# Patient Record
Sex: FEMALE | Race: WHITE | NOT HISPANIC OR LATINO | Employment: OTHER | URBAN - METROPOLITAN AREA
[De-identification: names, ages, dates, MRNs, and addresses within clinical notes are randomized per-mention and may not be internally consistent; named-entity substitution may affect disease eponyms.]

---

## 2017-11-21 ENCOUNTER — OFFICE VISIT (OUTPATIENT)
Dept: ALLERGY | Facility: CLINIC | Age: 82
End: 2017-11-21
Payer: MEDICARE

## 2017-11-21 VITALS
WEIGHT: 137.38 LBS | SYSTOLIC BLOOD PRESSURE: 130 MMHG | BODY MASS INDEX: 28.84 KG/M2 | DIASTOLIC BLOOD PRESSURE: 78 MMHG | HEIGHT: 58 IN

## 2017-11-21 DIAGNOSIS — K21.9 LARYNGOPHARYNGEAL REFLUX (LPR): ICD-10-CM

## 2017-11-21 DIAGNOSIS — H10.403 CHRONIC CONJUNCTIVITIS OF BOTH EYES, UNSPECIFIED CHRONIC CONJUNCTIVITIS TYPE: ICD-10-CM

## 2017-11-21 DIAGNOSIS — H91.93 BILATERAL HEARING LOSS, UNSPECIFIED HEARING LOSS TYPE: ICD-10-CM

## 2017-11-21 DIAGNOSIS — J31.0 OTHER CHRONIC RHINITIS: Primary | ICD-10-CM

## 2017-11-21 DIAGNOSIS — I10 BENIGN ESSENTIAL HTN: ICD-10-CM

## 2017-11-21 PROCEDURE — 99204 OFFICE O/P NEW MOD 45 MIN: CPT | Mod: ,,, | Performed by: ALLERGY & IMMUNOLOGY

## 2017-11-21 RX ORDER — LOVASTATIN 20 MG/1
20 TABLET ORAL NIGHTLY
COMMUNITY
End: 2020-02-04 | Stop reason: SDUPTHER

## 2017-11-21 RX ORDER — FUROSEMIDE 40 MG/1
40 TABLET ORAL DAILY
COMMUNITY

## 2017-11-21 RX ORDER — MONTELUKAST SODIUM 10 MG/1
10 TABLET ORAL DAILY
COMMUNITY
End: 2021-02-09

## 2017-11-21 RX ORDER — FLUTICASONE PROPIONATE 50 MCG
1 SPRAY, SUSPENSION (ML) NASAL DAILY
Qty: 3 BOTTLE | Refills: 4 | Status: SHIPPED | OUTPATIENT
Start: 2017-11-21 | End: 2018-07-23 | Stop reason: SDUPTHER

## 2017-11-21 RX ORDER — HYDROXYCHLOROQUINE SULFATE 200 MG/1
200 TABLET, FILM COATED ORAL 2 TIMES DAILY
COMMUNITY

## 2017-11-21 RX ORDER — AZELASTINE 1 MG/ML
1 SPRAY, METERED NASAL 2 TIMES DAILY
Qty: 90 ML | Refills: 3 | Status: SHIPPED | OUTPATIENT
Start: 2017-11-21 | End: 2018-07-23 | Stop reason: SDUPTHER

## 2017-11-21 RX ORDER — OMEPRAZOLE 20 MG/1
20 CAPSULE, DELAYED RELEASE ORAL 2 TIMES DAILY
COMMUNITY
End: 2020-02-13 | Stop reason: SDUPTHER

## 2017-11-21 RX ORDER — AMLODIPINE BESYLATE 5 MG/1
5 TABLET ORAL DAILY
COMMUNITY
End: 2018-07-23

## 2017-11-21 NOTE — PROGRESS NOTES
"Subjective:       Patient ID: Mai Suarez is a 89 y.o. female.    Chief Complaint: Allergies (stuffy, itchy, runny nose, hoarseness)    HPI     Pt is referred from Dr. Del Toro for rhinitis symptoms. Native to [facility].   Main symptoms are congestion, rhinorrhea, pruritus, hoarseness  Tx:  none  Onset: jan 2016  Timing of day doesn't matter.     She has hoarseness in her voice that "comes and goes."  She has associated reflux for which she takes prilosec. She does not take this on an empty stomach but after breakfast.       Review of Systems      General: neg unexpected weight changes, fevers, chills, night sweats, malaise  HEENT: see hpi, Neg eye pain, vision changes, ear drainage, nose bleeds, throat tightness, sores in the mouth  CV: Neg chest pain, palpitations, swelling  Resp: see hpi, +cough neg shortness of breath, hemoptysis,   GI: see hpi, neg dysphagia, night abdominal pain, reflux, chronic diarrhea, chronic constipation  Derm: See Hpi, neg new rash, neg flushing  Mu/sk: Neg joint pain, joint swelling   Psych: Neg anxiety  neuro: neg chronic headaches, muscle weakness  Endo: neg heat/cold intolerance, chronic fatigue    Objective:       Vitals:    11/21/17 1045   BP: 130/78   Weight: 62.3 kg (137 lb 6.4 oz)   Height: 4' 10" (1.473 m)   ;  Physical Exam    General: no acute distress, well developed well nourished   HEENT:   Head:normocephalic atraumatic  Eyes: DAIANA, EOMI, Neg injection, scleral icterus, or conjunctival papillary hypertrophy.  Ears: tm clear bilaterally, normal canal  Nose:3+ inferior turbinates pink, neg nasal polyps            Mucosa:mild dryness            Septal irritation: none  OP: mucus membranes moist, - cobblestoning, - PND, neg erythema or lesions  Neck: supple, Full range of motion, neg lymphadenopathy  Chest: full respiratory excursion no abnormal chest abnormality  Resp: clear to ascultation bilaterally  CV: RRR, neg MRG, brisk capillary refill  Abdomen: BS+, non tender, non " distended   Ext:  Neg clubbing, cyanosis, + pitting edema- mild   Skin: Neg rashes or lesions  Lymph: neg supraclavicular, axillary     Assessment:       1. Other chronic rhinitis    2. Laryngopharyngeal reflux (LPR)    3. Bilateral hearing loss, unspecified hearing loss type    4. Benign essential HTN    5. Chronic conjunctivitis of both eyes, unspecified chronic conjunctivitis type        Plan:       Other chronic rhinitis  -     fluticasone (FLONASE) 50 mcg/actuation nasal spray; 1 spray by Each Nare route once daily.  Dispense: 3 Bottle; Refill: 4  -     azelastine (ASTELIN) 137 mcg (0.1 %) nasal spray; 1 spray (137 mcg total) by Nasal route 2 (two) times daily.  Dispense: 90 mL; Refill: 3    Laryngopharyngeal reflux (LPR)    Bilateral hearing loss, unspecified hearing loss type    Benign essential HTN    Chronic conjunctivitis of both eyes, unspecified chronic conjunctivitis type    rhinitis action plan reviewed. LPR action plan reviewed  Technique reviewed and dymista sample given in clinic today.   Pt to take PPI on empty stomach 40 mins before breakfast.   Start saline routinely  Start fluticasone routinely 1 sen bid, start azelastine 1 sen bid- nasal sprays should help with nasal symptoms.   Continue montelukast daily.   Pt to avoid decongestants as it may increase her blood pressure.   It may be difficult to communicate with her due to hearing loss, therefore everything was written down and given to her to ensure her understanding of the plan.       Follow up in 4-6 weeks, sooner if needed.     Radha Gandhi M.D.  Allergy/Immunology  [facility]   [phone] phone  [phone] fax

## 2017-11-21 NOTE — PATIENT INSTRUCTIONS
NOSE:   FLONASE OR FLUTICASONE 2 SPRAYS PER NOSTRIL TWICE DAILY FOR 2 WEEKS THEN ADJUST PER YOUR SYMPTOMS.   AZELASTINE - 1 SPRAY PER NOSTRIL TWICE DAILY OR AS NEEDED    MONITOR REFLUX SYMPTOMS:  TAKE OMEPRAZOLE OR PRILOSEC 40 MINS BEFORE BREAKFAST.

## 2017-11-21 NOTE — LETTER
November 21, 2017      Meng Del Toro MD  [address]  [address]  [place] [address]           [facility]  [address]  [address]  [place] [address]  Phone: [phone]  Fax: [phone]          Patient: Mai Suarez   MR Number: [id number]   YOB: 1919   Date of Visit: 11/21/2017       Dear Dr. Meng Del Toro:    Thank you for referring Mai Suarez to me for evaluation. Attached you will find relevant portions of my assessment and plan of care.    If you have questions, please do not hesitate to call me. I look forward to following Mai Suarez along with you.    Sincerely,    Radha Gandhi MD    Enclosure  CC:  No Recipients    If you would like to receive this communication electronically, please contact [email or web address] or [phone] to request more information on [employer] access.    For providers and/or their staff who would like to refer a patient to [facility], please contact us through our one-stop-shop provider referral line, Kendall Umanzor, at [phone].    If you feel you have received this communication in error or would no longer like to receive these types of communications, please e-mail [email or web address]

## 2017-11-21 NOTE — LETTER
November 21, 2017        Meng Del Toro MD  [address]  [address]  [facility] [address]             [facility]  [address]  [address]  [facility] [address]  Phone: [phone]  Fax: [phone]   Patient: Mai Suarez   MR Number: [id number]   YOB: 1919   Date of Visit: 11/21/2017       Dear Dr. Del Toro:    Thank you for referring Mai Suarez to me for evaluation. Attached you will find relevant portions of my assessment and plan of care.    If you have questions, please do not hesitate to call me. I look forward to following Mai Suarez along with you.    Sincerely,      MD SULAIMAN Travis MD Carl M. Gauthier Jr., MD Enclosure

## 2018-01-08 ENCOUNTER — OFFICE VISIT (OUTPATIENT)
Dept: ALLERGY | Facility: CLINIC | Age: 83
End: 2018-01-08
Payer: MEDICARE

## 2018-01-08 VITALS
HEIGHT: 58 IN | DIASTOLIC BLOOD PRESSURE: 88 MMHG | BODY MASS INDEX: 28.86 KG/M2 | WEIGHT: 137.5 LBS | SYSTOLIC BLOOD PRESSURE: 120 MMHG

## 2018-01-08 DIAGNOSIS — K21.9 LARYNGOPHARYNGEAL REFLUX (LPR): ICD-10-CM

## 2018-01-08 DIAGNOSIS — H91.93 BILATERAL HEARING LOSS, UNSPECIFIED HEARING LOSS TYPE: ICD-10-CM

## 2018-01-08 DIAGNOSIS — I10 BENIGN ESSENTIAL HTN: ICD-10-CM

## 2018-01-08 DIAGNOSIS — J31.0 OTHER CHRONIC RHINITIS: Primary | ICD-10-CM

## 2018-01-08 PROCEDURE — 99214 OFFICE O/P EST MOD 30 MIN: CPT | Mod: ,,, | Performed by: ALLERGY & IMMUNOLOGY

## 2018-01-08 NOTE — PROGRESS NOTES
"Subjective:       Patient ID: Mai Suarez is a 89 y.o. female.    Chief Complaint: Allergies    HPI     Pt presents from November for Follow up.   She states her headaches and rhinorrhea improved with tastes and smell, however, she still has sx.   She is taking fluticasone, azelastine, and ipratropium. She hasn't taken montelukast or singulair.   She is also concerned with hoarseness in her voice. She isn't using saline.   No one has visualized her vocal cords.   Timing of day doesn't matter for hoarseness and this comes and goes.     She has associated reflux for which she takes prilosec. She does not take this on an empty stomach but after breakfast.       Review of Systems      General: neg unexpected weight changes, fevers, chills, night sweats, malaise  HEENT: see hpi, Neg eye pain, vision changes, ear drainage, nose bleeds, throat tightness, sores in the mouth  CV: Neg chest pain, palpitations, swelling  Resp: see hpi, +cough neg shortness of breath, hemoptysis,   GI: see hpi, neg dysphagia, night abdominal pain, reflux, chronic diarrhea, chronic constipation  Derm: See Hpi, neg new rash, neg flushing  Mu/sk: Neg joint pain, joint swelling   Psych: Neg anxiety  neuro: neg chronic headaches, muscle weakness  Endo: neg heat/cold intolerance, chronic fatigue    Objective:       Vitals:    01/08/18 1103 01/08/18 1117   BP: (!) 150/84 120/88   Weight: 62.4 kg (137 lb 8 oz)    Height: 4' 10" (1.473 m)    ;  Physical Exam    General: no acute distress, well developed well nourished   HEENT:   Head:normocephalic atraumatic  Eyes: DAIANA, EOMI, Neg injection, scleral icterus, or conjunctival papillary hypertrophy.  Ears: tm clear bilaterally, normal canal  Nose: 2+ inferior turbinates pink, neg nasal polyps            Mucosa:mild dryness            Septal irritation: none  OP: mucus membranes moist, - cobblestoning, - PND, neg erythema or lesions  Neck: supple, Full range of motion, neg lymphadenopathy  Chest: full " respiratory excursion no abnormal chest abnormality  Resp: clear to ascultation bilaterally  CV: RRR, neg MRG, brisk capillary refill  Abdomen: BS+, non tender, non distended   Ext:  Neg clubbing, cyanosis, + pitting edema- mild   Skin: Neg rashes or lesions  Lymph: neg supraclavicular, axillary     Assessment:       1. Other chronic rhinitis    2. Laryngopharyngeal reflux (LPR)    3. Benign essential HTN    4. Bilateral hearing loss, unspecified hearing loss type        Plan:       Other chronic rhinitis    Laryngopharyngeal reflux (LPR)    Benign essential HTN    Bilateral hearing loss, unspecified hearing loss type    rhinitis action plan reviewed. LPR action plan reviewed. Still having post nasal drip but sense of smell improving.   Action plan written on word document in large letters to help her read it.   Increase fluticasone and azelastine and iptratropium  Start saline routinely  Recommended she see Dr. Garcia for vocal cord visualization.   He can visualize for LPR.    Follow up in 6 months, sooner if needed.     Radha Gandhi M.D.  Allergy/Immunology  [facility]   [phone] phone  [phone] fax

## 2018-07-23 ENCOUNTER — OFFICE VISIT (OUTPATIENT)
Dept: ALLERGY | Facility: CLINIC | Age: 83
End: 2018-07-23
Payer: MEDICARE

## 2018-07-23 VITALS
HEIGHT: 58 IN | DIASTOLIC BLOOD PRESSURE: 68 MMHG | BODY MASS INDEX: 28.8 KG/M2 | WEIGHT: 137.19 LBS | SYSTOLIC BLOOD PRESSURE: 120 MMHG

## 2018-07-23 DIAGNOSIS — R09.82 POST-NASAL DRIP: ICD-10-CM

## 2018-07-23 DIAGNOSIS — J34.89 RHINORRHEA: Primary | ICD-10-CM

## 2018-07-23 PROCEDURE — 99213 OFFICE O/P EST LOW 20 MIN: CPT | Mod: ,,, | Performed by: ALLERGY & IMMUNOLOGY

## 2018-07-23 RX ORDER — FLUTICASONE PROPIONATE 50 MCG
1 SPRAY, SUSPENSION (ML) NASAL 2 TIMES DAILY
Qty: 3 BOTTLE | Refills: 4 | Status: SHIPPED | OUTPATIENT
Start: 2018-07-23 | End: 2021-03-20

## 2018-07-23 RX ORDER — AZELASTINE 1 MG/ML
1 SPRAY, METERED NASAL 2 TIMES DAILY
Qty: 90 ML | Refills: 3 | Status: SHIPPED | OUTPATIENT
Start: 2018-07-23 | End: 2020-07-08

## 2018-07-23 RX ORDER — ACETAMINOPHEN 500 MG
500 TABLET ORAL EVERY 6 HOURS PRN
COMMUNITY

## 2018-07-23 RX ORDER — METOPROLOL SUCCINATE 100 MG/1
100 TABLET, EXTENDED RELEASE ORAL DAILY
COMMUNITY
End: 2020-11-23

## 2018-07-23 RX ORDER — LOSARTAN POTASSIUM 100 MG/1
100 TABLET ORAL DAILY
COMMUNITY
End: 2020-01-24 | Stop reason: SDUPTHER

## 2018-07-23 RX ORDER — IPRATROPIUM BROMIDE 21 UG/1
SPRAY, METERED NASAL
Qty: 90 ML | Refills: 4 | Status: SHIPPED | OUTPATIENT
Start: 2018-07-23 | End: 2020-07-08

## 2018-07-23 NOTE — PATIENT INSTRUCTIONS
Nose:  Ipratropium nasal spray for runny nose 2 sprays each nare as needed every 6 hours    Azelastine 1 spray per nare twice per day - congestion post nasal drip  - max 4 times per day     Fluticasone - congestion, post nasal drip , inflammation- 1 spray per nare twice per day  - max 2 sprays per nare twice per day

## 2018-07-23 NOTE — PROGRESS NOTES
"Subjective:       Patient ID: Mai Suarez is a 89 y.o. female.    Chief Complaint: Rhinitis (6 month f/u patient states she feels fine, but does still have the sniffles)    HPI     Pt presents from January for Follow up.   She states her headaches and rhinorrhea improved with tastes and smell, however, she still has sneezing and "sniffles."  She is taking fluticasone, azelastine 1 sen bid and ipratropium- ran out. She hasn't taken montelukast or singulair.   She is also concerned with hoarseness in her voice, but that is better. She isn't using saline.   Timing of day doesn't matter for hoarseness and this comes and goes.     She has associated reflux for which she takes prilosec. She does not take this on an empty stomach but after breakfast.       Review of Systems      General: neg unexpected weight changes, fevers, chills, night sweats, malaise  HEENT: see hpi, Neg eye pain, vision changes, ear drainage, nose bleeds, throat tightness, sores in the mouth  CV: Neg chest pain, palpitations, swelling  Resp: see hpi, +cough neg shortness of breath, hemoptysis,   GI: see hpi, neg dysphagia, night abdominal pain, reflux, chronic diarrhea, chronic constipation  Derm: See Hpi, neg new rash, neg flushing  Mu/sk: Neg joint pain, joint swelling   Psych: Neg anxiety  neuro: neg chronic headaches, muscle weakness  Endo: neg heat/cold intolerance, chronic fatigue    Objective:       Vitals:    07/23/18 0949   BP: 120/68   Weight: 62.2 kg (137 lb 3.2 oz)   Height: 4' 10" (1.473 m)   ;  Physical Exam    General: no acute distress, well developed well nourished   HEENT:   Head:normocephalic atraumatic  Eyes: DAIANA, EOMI, Neg injection, scleral icterus, or conjunctival papillary hypertrophy.  Ears: tm clear bilaterally, normal canal  Nose: 2+ inferior turbinates pink, neg nasal polyps            Mucosa:mild dryness            Septal irritation: none  OP: mucus membranes moist, - cobblestoning, - PND, neg erythema or " lesions  Neck: supple, Full range of motion, neg lymphadenopathy  Chest: full respiratory excursion no abnormal chest abnormality  Resp: clear to ascultation bilaterally  CV: RRR, neg MRG, brisk capillary refill  Abdomen: BS+, non tender, non distended   Ext:  Neg clubbing, cyanosis, - pitting edema- mild   Skin: Neg rashes or lesions  Lymph: neg supraclavicular, axillary     Assessment:       1. Rhinorrhea    2. Post-nasal drip        Plan:       Rhinorrhea  -     ipratropium (ATROVENT) 0.03 % nasal spray; 2 sprays per nare as needed four times daily for sniffles  Dispense: 90 mL; Refill: 4  -     fluticasone (FLONASE) 50 mcg/actuation nasal spray; 1 spray (50 mcg total) by Each Nare route 2 (two) times daily.  Dispense: 3 Bottle; Refill: 4    Post-nasal drip  -     azelastine (ASTELIN) 137 mcg (0.1 %) nasal spray; 1 spray (137 mcg total) by Nasal route 2 (two) times daily.  Dispense: 90 mL; Refill: 3      rhinitis action plan reviewed. LPR action plan reviewed.  Sense of smell better  Sense of taste is better      Action plan written on word document in large letters to help her read it.   Increase fluticasone and azelastine and restart iptratropium  Start saline routinely  Recommended she see Dr. Garcia for vocal cord visualization if hoarseness still an issue   He can visualize for LPR.    Follow up in 6 months, sooner if needed.     Radha Gandhi M.D.  Allergy/Immunology  [facility]   [phone] phone  [phone] fax

## 2019-06-28 ENCOUNTER — OFFICE VISIT (OUTPATIENT)
Dept: HEMATOLOGY/ONCOLOGY | Facility: CLINIC | Age: 84
End: 2019-06-28
Payer: MEDICARE

## 2019-06-28 ENCOUNTER — TELEPHONE (OUTPATIENT)
Dept: HEMATOLOGY/ONCOLOGY | Facility: CLINIC | Age: 84
End: 2019-06-28

## 2019-06-28 VITALS
HEART RATE: 68 BPM | DIASTOLIC BLOOD PRESSURE: 78 MMHG | TEMPERATURE: 98 F | WEIGHT: 136.38 LBS | BODY MASS INDEX: 31.56 KG/M2 | HEIGHT: 55 IN | SYSTOLIC BLOOD PRESSURE: 180 MMHG | RESPIRATION RATE: 20 BRPM

## 2019-06-28 DIAGNOSIS — I82.412 DEEP VENOUS THROMBOSIS OF LEFT PROFUNDA FEMORIS VEIN: ICD-10-CM

## 2019-06-28 PROCEDURE — 99204 OFFICE O/P NEW MOD 45 MIN: CPT | Mod: ,,, | Performed by: INTERNAL MEDICINE

## 2019-06-28 PROCEDURE — 99204 PR OFFICE/OUTPT VISIT, NEW, LEVL IV, 45-59 MIN: ICD-10-PCS | Mod: ,,, | Performed by: INTERNAL MEDICINE

## 2019-07-13 PROBLEM — I82.412 DEEP VENOUS THROMBOSIS OF LEFT PROFUNDA FEMORIS VEIN: Status: ACTIVE | Noted: 2019-07-13

## 2019-07-14 NOTE — PROGRESS NOTES
[facility] History & Physical    Subjective:      Patient ID:   Mai Suarez  89 y.o. female  12/4/1919  [place]      Chief Complaint:   L leg DVT    HPI:  89 y.o. female with L leg swelling and weeping x's 2 months, found to have DVT.    Hx HTN, GERD, angina, RA, OA.    T&A at 10, appendectomy at 21, C section x's 3. BTL, L & R cataract, R hand x's 2, L knee x's 3, R foot x's 1.    Smoke 1 ppd x's 50 years, none x's 20 years.  ETOH no.  Job .  Allergy no.    Migrated from [place] to [place], 1926 to 1944.  Father had mustard gas exposure WW1. 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    Dad AMI, mustard gas exposure.  Mom CVA.  Sister, sickly.  Daughter pneumonia, auto immune dx.  No family hx of DVT, PE.      ROS:   GEN: normal without any fever, night sweats or weight loss  HEENT: normal with no HA's, sore throat, stiff neck, changes in vision  CV: normal with no CP, SOB, PND, AKHTAR or orthopnea  PULM: normal with no SOB, cough, hemoptysis, sputum or pleuritic pain  GI: normal with no abdominal pain, nausea, vomiting, constipation, diarrhea, melanotic stools, BRBPR, or hematemesis  : normal with no hematuria, dysuria  BREAST: normal with no mass, discharge, pain  SKIN: normal with no rash, erythema, bruising, or swelling     Past Medical History:   Diagnosis Date    Benign essential HTN     Hearing loss     Hypercholesterolemia     Laryngopharyngeal reflux (LPR)     Rheumatoid arthritis     Wears dentures      No past surgical history on file.    Review of patient's allergies indicates:  No Known Allergies        Current Outpatient Medications:     acetaminophen (TYLENOL) 500 MG tablet, Take 500 mg by mouth every 6 (six) hours as needed for Pain., Disp: , Rfl:     apixaban (ELIQUIS) 5 mg (74 tabs) DsPk, Take by mouth. For the first 7 days take two 5 mg tablets twice daily.  After 7 days take one 5 mg tablet twice daily., Disp: , Rfl:     fluticasone (FLONASE) 50 mcg/actuation nasal spray, 1  "spray (50 mcg total) by Each Nare route 2 (two) times daily., Disp: 3 Bottle, Rfl: 4    furosemide (LASIX) 20 MG tablet, Take 40 mg by mouth once daily. , Disp: , Rfl:     hydroxychloroquine (PLAQUENIL) 200 mg tablet, Take 200 mg by mouth 2 (two) times daily., Disp: , Rfl:     ipratropium (ATROVENT) 0.03 % nasal spray, 2 sprays per nare as needed four times daily for sniffles, Disp: 90 mL, Rfl: 4    losartan (COZAAR) 100 MG tablet, Take 100 mg by mouth once daily., Disp: , Rfl:     lovastatin (MEVACOR) 20 MG tablet, Take 20 mg by mouth every evening., Disp: , Rfl:     metoprolol succinate (TOPROL-XL) 100 MG 24 hr tablet, Take 100 mg by mouth once daily., Disp: , Rfl:     montelukast (SINGULAIR) 10 mg tablet, Take 10 mg by mouth once daily., Disp: , Rfl:     omeprazole (PRILOSEC) 20 MG capsule, Take 20 mg by mouth 2 (two) times daily., Disp: , Rfl:     azelastine (ASTELIN) 137 mcg (0.1 %) nasal spray, 1 spray (137 mcg total) by Nasal route 2 (two) times daily., Disp: 90 mL, Rfl: 3          Objective:   Vitals:  Blood pressure (!) 180/78, pulse 68, temperature 97.8 °F (36.6 °C), temperature source Oral, resp. rate 20, height 4' 7" (1.397 m), weight 61.9 kg (136 lb 6.4 oz).    Physical Examination:   GEN: no apparent distress, comfortable  HEAD: atraumatic and normocephalic  EYES: no pallor, no icterus  ENT:  no pharyngeal erythema, external ears WNL; no nasal discharge; no thrush  NECK: no masses, thyroid normal, trachea midline, no LAD/LN's, supple  CV: RRR with no murmur; normal pulse; normal S1 and S2; no pedal edema  CHEST: Normal respiratory effort; CTAB; normal breath sounds; no wheeze or crackles  ABDOM: nontender and nondistended; soft;  no rebound/guarding  MUSC/Skeletal: ROM normal; no crepitus; joints normal; no deformities   EXTREM: L & R leg with varicose veins and 2+ edema.  SKIN: no rashes, lesions, ulcers, petechiae   : no cvat  NEURO: grossly intact; motor/sensory WNL;  no tremors  PSYCH: " normal mood, affect and behavior  LYMPH: normal cervical, supraclavicular, axillary and inguinal LN.  \    Assessment:   (1) 89 y.o. female with diagnosis of R leg femoral vein DVT, 5/26/19.    (2)Hgb 12.2, WBC 12,300, plt cnt 159,000.    Continue Eliquis 5 mg BID.    RTC 2-3 months.    After 6 months of anticoagulation, will   Re check U/S of Leg and check   Hypercoag. Eval.    Given the extent of her varicose veins and edema of the legs, I would continue on Eliquis chronically.         I have explained and the patient understands all of  the current recommendation(s). I have answered all of their questions to the best of my ability and to their complete satisfaction.

## 2019-08-12 ENCOUNTER — OFFICE VISIT (OUTPATIENT)
Dept: HEMATOLOGY/ONCOLOGY | Facility: CLINIC | Age: 84
End: 2019-08-12
Payer: MEDICARE

## 2019-08-12 VITALS
TEMPERATURE: 98 F | BODY MASS INDEX: 30.05 KG/M2 | RESPIRATION RATE: 20 BRPM | HEART RATE: 63 BPM | WEIGHT: 129.31 LBS | DIASTOLIC BLOOD PRESSURE: 73 MMHG | SYSTOLIC BLOOD PRESSURE: 141 MMHG

## 2019-08-12 DIAGNOSIS — I82.5Z1 LOWER LEG DVT (DEEP VENOUS THROMBOEMBOLISM), CHRONIC, RIGHT: ICD-10-CM

## 2019-08-12 DIAGNOSIS — M79.604 PAIN AND SWELLING OF LOWER EXTREMITY, RIGHT: ICD-10-CM

## 2019-08-12 DIAGNOSIS — I82.412 DEEP VENOUS THROMBOSIS OF LEFT PROFUNDA FEMORIS VEIN: Primary | ICD-10-CM

## 2019-08-12 DIAGNOSIS — M79.89 PAIN AND SWELLING OF LOWER EXTREMITY, RIGHT: ICD-10-CM

## 2019-08-12 PROCEDURE — 99214 OFFICE O/P EST MOD 30 MIN: CPT | Mod: S$GLB,,, | Performed by: INTERNAL MEDICINE

## 2019-08-12 PROCEDURE — 99214 PR OFFICE/OUTPT VISIT, EST, LEVL IV, 30-39 MIN: ICD-10-PCS | Mod: S$GLB,,, | Performed by: INTERNAL MEDICINE

## 2019-08-22 NOTE — PROGRESS NOTES
[facility] History & Physical    Subjective:      Patient ID:   Mai Suarez  89 y.o. female  12/4/1919  [place]      Chief Complaint:   L leg DVT    HPI:  89 y.o. female with L leg swelling and weeping x's 2 months, found to have DVT.  She is now on   Eliquis 5 mg BID.    Recently she hit R leg, large resolving hematoma noted.  Likely will require 2-4 weeks to resolve.    Hx HTN, GERD, angina, RA, OA.    T&A at 10, appendectomy at 21, C section x's 3. BTL, L & R cataract, R hand x's 2, L knee x's 3, R foot x's 1.    Smoke 1 ppd x's 50 years, none x's 20 years.  ETOH no.  Job .  Allergy no.    Migrated from [place] to [place], 1926 to 1944.  Father had mustard gas exposure WW1. 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    Dad AMI, mustard gas exposure.  Mom CVA.  Sister, sickly.  Daughter pneumonia, auto immune dx.  No family hx of DVT, PE.      ROS:   GEN: normal without any fever, night sweats or weight loss  HEENT: normal with no HA's, sore throat, stiff neck, changes in vision  CV: normal with no CP, SOB, PND, AKHTAR or orthopnea  PULM: normal with no SOB, cough, hemoptysis, sputum or pleuritic pain  GI: normal with no abdominal pain, nausea, vomiting, constipation, diarrhea, melanotic stools, BRBPR, or hematemesis  : normal with no hematuria, dysuria  BREAST: normal with no mass, discharge, pain  SKIN: see HPI     Past Medical History:   Diagnosis Date    Benign essential HTN     Hearing loss     Hypercholesterolemia     Laryngopharyngeal reflux (LPR)     Rheumatoid arthritis     Wears dentures      History reviewed. No pertinent surgical history.    Review of patient's allergies indicates:  No Known Allergies        Current Outpatient Medications:     acetaminophen (TYLENOL) 500 MG tablet, Take 500 mg by mouth every 6 (six) hours as needed for Pain., Disp: , Rfl:     apixaban (ELIQUIS) 5 mg (74 tabs) DsPk, Take by mouth. For the first 7 days take two 5 mg tablets twice daily.  After 7 days  take one 5 mg tablet twice daily., Disp: , Rfl:     fluticasone (FLONASE) 50 mcg/actuation nasal spray, 1 spray (50 mcg total) by Each Nare route 2 (two) times daily., Disp: 3 Bottle, Rfl: 4    furosemide (LASIX) 20 MG tablet, Take 40 mg by mouth once daily. , Disp: , Rfl:     hydroxychloroquine (PLAQUENIL) 200 mg tablet, Take 200 mg by mouth 2 (two) times daily., Disp: , Rfl:     ipratropium (ATROVENT) 0.03 % nasal spray, 2 sprays per nare as needed four times daily for sniffles, Disp: 90 mL, Rfl: 4    losartan (COZAAR) 100 MG tablet, Take 100 mg by mouth once daily., Disp: , Rfl:     lovastatin (MEVACOR) 20 MG tablet, Take 20 mg by mouth every evening., Disp: , Rfl:     metoprolol succinate (TOPROL-XL) 100 MG 24 hr tablet, Take 100 mg by mouth once daily., Disp: , Rfl:     montelukast (SINGULAIR) 10 mg tablet, Take 10 mg by mouth once daily., Disp: , Rfl:     omeprazole (PRILOSEC) 20 MG capsule, Take 20 mg by mouth 2 (two) times daily., Disp: , Rfl:     azelastine (ASTELIN) 137 mcg (0.1 %) nasal spray, 1 spray (137 mcg total) by Nasal route 2 (two) times daily., Disp: 90 mL, Rfl: 3          Objective:   Vitals:  Blood pressure (!) 141/73, pulse 63, temperature 97.6 °F (36.4 °C), temperature source Oral, resp. rate 20, weight 58.7 kg (129 lb 4.8 oz).    Physical Examination:   GEN: no apparent distress, comfortable  HEAD: atraumatic and normocephalic  EYES: no pallor, no icterus  ENT:  no pharyngeal erythema, external ears WNL; no nasal discharge; no thrush  NECK: no masses, thyroid normal, trachea midline, no LAD/LN's, supple  CV: RRR with no murmur; normal pulse; normal S1 and S2; no pedal edema  CHEST: Normal respiratory effort; CTAB; normal breath sounds; no wheeze or crackles  ABDOM: nontender and nondistended; soft;  no rebound/guarding  MUSC/Skeletal: ROM normal; no crepitus; joints normal; no deformities   EXTREM: L & R leg with varicose veins and 2+ edema.  SKIN: very large hematoma at R anterior  leg, appears to be improving and decreasing.  As compared to a picture on daughter's cell phone.  : no cvat  NEURO: grossly intact; motor/sensory WNL;  no tremors  PSYCH: normal mood, affect and behavior  LYMPH: normal cervical, supraclavicular, axillary and inguinal LN.  \    Assessment:   (1) 89 y.o. female with diagnosis of R leg femoral vein DVT, 5/26/19.    (2)Hgb 12.2, WBC 12,300, plt cnt 159,000.    Continue Eliquis 5 mg BID.    (3)R leg hematoma after hitting leg, getting better.    RTC in 3 months to   Re check U/S of Leg and later  check   Hypercoag. Eval. at 6 months.    Given the extent of her varicose veins and edema of the legs, I would continue on Eliquis chronically.         I have explained and the patient understands all of  the current recommendation(s). I have answered all of their questions to the best of my ability and to their complete satisfaction.

## 2019-10-03 ENCOUNTER — OFFICE VISIT (OUTPATIENT)
Dept: FAMILY MEDICINE | Facility: CLINIC | Age: 84
End: 2019-10-03
Payer: MEDICARE

## 2019-10-03 VITALS
WEIGHT: 130.38 LBS | TEMPERATURE: 98 F | SYSTOLIC BLOOD PRESSURE: 114 MMHG | DIASTOLIC BLOOD PRESSURE: 68 MMHG | BODY MASS INDEX: 28.13 KG/M2 | HEART RATE: 68 BPM | HEIGHT: 57 IN

## 2019-10-03 DIAGNOSIS — Z23 FLU VACCINE NEED: ICD-10-CM

## 2019-10-03 DIAGNOSIS — R60.9 EDEMA, UNSPECIFIED TYPE: ICD-10-CM

## 2019-10-03 DIAGNOSIS — I10 ESSENTIAL HYPERTENSION: Primary | ICD-10-CM

## 2019-10-03 DIAGNOSIS — Z99.89 USES ROLLER WALKER: ICD-10-CM

## 2019-10-03 DIAGNOSIS — K21.9 GASTROESOPHAGEAL REFLUX DISEASE WITHOUT ESOPHAGITIS: ICD-10-CM

## 2019-10-03 DIAGNOSIS — M06.9 RHEUMATOID ARTHRITIS, INVOLVING UNSPECIFIED SITE, UNSPECIFIED RHEUMATOID FACTOR PRESENCE: ICD-10-CM

## 2019-10-03 DIAGNOSIS — N18.9 CHRONIC KIDNEY DISEASE, UNSPECIFIED CKD STAGE: ICD-10-CM

## 2019-10-03 PROCEDURE — 99214 PR OFFICE/OUTPT VISIT, EST, LEVL IV, 30-39 MIN: ICD-10-PCS | Mod: 25,S$GLB,, | Performed by: FAMILY MEDICINE

## 2019-10-03 PROCEDURE — 99214 OFFICE O/P EST MOD 30 MIN: CPT | Mod: 25,S$GLB,, | Performed by: FAMILY MEDICINE

## 2019-10-03 PROCEDURE — 90662 IIV NO PRSV INCREASED AG IM: CPT | Mod: S$GLB,,, | Performed by: FAMILY MEDICINE

## 2019-10-03 PROCEDURE — G0008 ADMIN INFLUENZA VIRUS VAC: HCPCS | Mod: S$GLB,,, | Performed by: FAMILY MEDICINE

## 2019-10-03 PROCEDURE — 90662 FLU VACCINE - HIGH DOSE (65+) PRESERVATIVE FREE IM: ICD-10-PCS | Mod: S$GLB,,, | Performed by: FAMILY MEDICINE

## 2019-10-03 PROCEDURE — G0008 FLU VACCINE - HIGH DOSE (65+) PRESERVATIVE FREE IM: ICD-10-PCS | Mod: S$GLB,,, | Performed by: FAMILY MEDICINE

## 2019-10-03 NOTE — PROGRESS NOTES
SUBJECTIVE:    Patient ID: Mai Suarez is a 89 y.o. female.    Chief Complaint: Hypertension (check up); Gastroesophageal Reflux; Chronic Kidney Disease; and Arthritis    Pt here to checkup on HTN, GERD, RA,     HTN is controlled.    RA is doing well on plaquenil, sees Dr. Delgado next month, on Nov 13..    Did not see Dr. Behtala yet.   GERD is doing well on omeprazole.    Allergies are controlled flonase and astepro.        Denies any falls since last visit. Uses rolling walker.      No visits with results within 6 Month(s) from this visit.   Latest known visit with results is:   No results found for any previous visit.       Past Medical History:   Diagnosis Date    Benign essential HTN     Hearing loss     Hypercholesterolemia     Laryngopharyngeal reflux (LPR)     Rheumatoid arthritis     Wears dentures      History reviewed. No pertinent surgical history.  Family History   Problem Relation Age of Onset    Atopy Neg Hx        Marital Status:   Alcohol History:  reports that she drinks alcohol.  Tobacco History:  reports that she has never smoked. She has never used smokeless tobacco.  Drug History:  reports that she does not use drugs.    Review of patient's allergies indicates:  No Known Allergies    Current Outpatient Medications:     apixaban (ELIQUIS) 5 mg (74 tabs) DsPk, Take by mouth. For the first 7 days take two 5 mg tablets twice daily.  After 7 days take one 5 mg tablet twice daily., Disp: , Rfl:     fluticasone (FLONASE) 50 mcg/actuation nasal spray, 1 spray (50 mcg total) by Each Nare route 2 (two) times daily., Disp: 3 Bottle, Rfl: 4    furosemide (LASIX) 20 MG tablet, Take 40 mg by mouth once daily. , Disp: , Rfl:     hydroxychloroquine (PLAQUENIL) 200 mg tablet, Take 200 mg by mouth 2 (two) times daily., Disp: , Rfl:     losartan (COZAAR) 100 MG tablet, Take 100 mg by mouth once daily., Disp: , Rfl:     lovastatin (MEVACOR) 20 MG tablet, Take 20 mg by mouth every  "evening., Disp: , Rfl:     metoprolol succinate (TOPROL-XL) 100 MG 24 hr tablet, Take 100 mg by mouth once daily., Disp: , Rfl:     omeprazole (PRILOSEC) 20 MG capsule, Take 20 mg by mouth 2 (two) times daily., Disp: , Rfl:     acetaminophen (TYLENOL) 500 MG tablet, Take 500 mg by mouth every 6 (six) hours as needed for Pain., Disp: , Rfl:     azelastine (ASTELIN) 137 mcg (0.1 %) nasal spray, 1 spray (137 mcg total) by Nasal route 2 (two) times daily. (Patient not taking: Reported on 10/3/2019), Disp: 90 mL, Rfl: 3    ipratropium (ATROVENT) 0.03 % nasal spray, 2 sprays per nare as needed four times daily for sniffles (Patient not taking: Reported on 10/3/2019), Disp: 90 mL, Rfl: 4    montelukast (SINGULAIR) 10 mg tablet, Take 10 mg by mouth once daily., Disp: , Rfl:     Review of Systems   Constitutional: Negative for appetite change, fatigue, fever and unexpected weight change.   Respiratory: Negative for cough, chest tightness, shortness of breath and wheezing.    Cardiovascular: Positive for leg swelling. Negative for chest pain.   Gastrointestinal: Negative for abdominal pain, constipation, nausea and vomiting.   Genitourinary: Negative for difficulty urinating, dysuria, frequency and menstrual problem.   Musculoskeletal: Negative for arthralgias, back pain, myalgias and neck pain.   Skin: Negative for rash.   Neurological: Negative for dizziness, weakness, numbness and headaches.   Hematological: Bruises/bleeds easily.   Psychiatric/Behavioral: Negative for dysphoric mood, sleep disturbance and suicidal ideas. The patient is not nervous/anxious.           Objective:      Vitals:    10/03/19 1521   BP: 114/68   Pulse: 68   Temp: 97.6 °F (36.4 °C)   Weight: 59.1 kg (130 lb 6.4 oz)   Height: 4' 8.5" (1.435 m)     Body mass index is 28.72 kg/m².  Physical Exam   Constitutional: She is oriented to person, place, and time. She appears well-developed and well-nourished. No distress.   thin   HENT:   Head: " Normocephalic and atraumatic.   Neck: Neck supple. No thyromegaly present.   Cardiovascular: Normal rate and normal heart sounds. Exam reveals no friction rub.   No murmur heard.  Pulmonary/Chest: Effort normal and breath sounds normal. No stridor. She has no wheezes. She has no rales.   Abdominal: Soft. Bowel sounds are normal. She exhibits no distension. There is no tenderness.   Genitourinary: No breast swelling or tenderness.   Musculoskeletal: She exhibits edema (+1 bilateral pitting LE).   Lymphadenopathy:     She has no cervical adenopathy.   Neurological: She is alert and oriented to person, place, and time.   Skin: Skin is warm and dry. Bruising (to right LE) noted. No rash noted.   Psychiatric: She has a normal mood and affect. Her behavior is normal. Judgment and thought content normal. Cognition and memory are normal. She is attentive.         Assessment:       1. Essential hypertension    2. Gastroesophageal reflux disease without esophagitis    3. Edema, unspecified type    4. Rheumatoid arthritis, involving unspecified site, unspecified rheumatoid factor presence    5. Flu vaccine need    6. Chronic kidney disease, unspecified CKD stage    7. Uses roller walker         Plan:       Essential hypertension  Comments:  Blood pressure controlled. Will continue to monitor    Gastroesophageal reflux disease without esophagitis  Comments:  Controlled on omeprazole.  Discussed weaning.  Will continue to monitor symptoms.    Edema, unspecified type  Comments:  Moderate.  Patient to continue elevation and stockings.  Will continue to monitor    Rheumatoid arthritis, involving unspecified site, unspecified rheumatoid factor presence  Comments:  Stable, asymptomatic.  Continue on Plaquenil.  Continue to follow with Dr. Delgado    Flu vaccine need  Comments:  Patient given flu vaccine at this visit.  Orders:  -     Influenza - High Dose (65+) (PF) (IM)    Chronic kidney disease, unspecified CKD  stage  Comments:  Will continue to monitor with labs from Rheumatology or cardiology.    Uses roller walker  Comments:  Pt encouraged to continue to use walker to stabilize gait for fall prevention      Follow up in about 4 months (around 2/3/2020) for HTN, GERD, CKD.

## 2019-10-23 ENCOUNTER — HOSPITAL ENCOUNTER (OUTPATIENT)
Dept: RADIOLOGY | Facility: HOSPITAL | Age: 84
Discharge: HOME | End: 2019-10-23
Attending: INTERNAL MEDICINE
Payer: MEDICARE

## 2019-10-23 DIAGNOSIS — M79.89 PAIN AND SWELLING OF LOWER EXTREMITY, RIGHT: ICD-10-CM

## 2019-10-23 DIAGNOSIS — M79.604 PAIN AND SWELLING OF LOWER EXTREMITY, RIGHT: ICD-10-CM

## 2019-10-23 DIAGNOSIS — I82.5Z1 LOWER LEG DVT (DEEP VENOUS THROMBOEMBOLISM), CHRONIC, RIGHT: ICD-10-CM

## 2019-10-23 PROCEDURE — 93971 EXTREMITY STUDY: CPT | Mod: TC,PO,RT

## 2019-10-27 ENCOUNTER — TELEPHONE (OUTPATIENT)
Dept: HEMATOLOGY/ONCOLOGY | Facility: CLINIC | Age: 84
End: 2019-10-27

## 2019-10-28 ENCOUNTER — TELEPHONE (OUTPATIENT)
Dept: HEMATOLOGY/ONCOLOGY | Facility: CLINIC | Age: 84
End: 2019-10-28

## 2019-10-28 NOTE — TELEPHONE ENCOUNTER
Tell her U/S showed that clot had resolved.    Stay on her Eliquis blood thinner for now.    Keep appt for 11/2019.

## 2019-10-28 NOTE — TELEPHONE ENCOUNTER
Let pt know U/S showed that clot had resolved.     Stay on her Eliquis blood thinner for now.     Keep appt for 11/2019.

## 2019-12-10 ENCOUNTER — OFFICE VISIT (OUTPATIENT)
Dept: HEMATOLOGY/ONCOLOGY | Facility: CLINIC | Age: 84
End: 2019-12-10
Payer: MEDICARE

## 2019-12-10 VITALS
HEART RATE: 68 BPM | TEMPERATURE: 97 F | SYSTOLIC BLOOD PRESSURE: 136 MMHG | WEIGHT: 132.5 LBS | BODY MASS INDEX: 29.18 KG/M2 | DIASTOLIC BLOOD PRESSURE: 58 MMHG | RESPIRATION RATE: 18 BRPM

## 2019-12-10 DIAGNOSIS — I82.5Z1 LOWER LEG DVT (DEEP VENOUS THROMBOEMBOLISM), CHRONIC, RIGHT: Primary | ICD-10-CM

## 2019-12-10 PROCEDURE — 1159F PR MEDICATION LIST DOCUMENTED IN MEDICAL RECORD: ICD-10-PCS | Mod: S$GLB,,, | Performed by: INTERNAL MEDICINE

## 2019-12-10 PROCEDURE — 99214 PR OFFICE/OUTPT VISIT, EST, LEVL IV, 30-39 MIN: ICD-10-PCS | Mod: S$GLB,,, | Performed by: INTERNAL MEDICINE

## 2019-12-10 PROCEDURE — 1159F MED LIST DOCD IN RCRD: CPT | Mod: S$GLB,,, | Performed by: INTERNAL MEDICINE

## 2019-12-10 PROCEDURE — 99214 OFFICE O/P EST MOD 30 MIN: CPT | Mod: S$GLB,,, | Performed by: INTERNAL MEDICINE

## 2019-12-11 NOTE — PROGRESS NOTES
[facility] History & Physical    Subjective:      Patient ID:   Mai Suarez  89 y.o. female  12/4/1919  [place]      Chief Complaint:   L leg DVT    HPI:  89 y.o. female with L leg swelling and weeping x's 2 months, found to have DVT.  She is now on   Eliquis 5 mg BID.    Recently she hit R leg, large resolving hematoma noted.  On exam the leg is better with resolution of the hematoma.    U/S no residual DVT seen at the R leg.  She has chronic edema and varicose veins, increased risk of future clot event.  Continue Eliquis 5 mg BID prophylaxis.    Hx HTN, GERD, angina, RA, OA.    T&A at 10, appendectomy at 21, C section x's 3. BTL, L & R cataract, R hand x's 2, L knee x's 3, R foot x's 1.    Smoke 1 ppd x's 50 years, none x's 20 years.  ETOH no.  Job .  Allergy no.    Migrated from [place] to [place], 1926 to 1944.  Father had mustard gas exposure WW1. War convoy [place].    Dad AMI, mustard gas exposure.  Mom CVA.  Sister, sickly.  Daughter pneumonia, auto immune dx.  No family hx of DVT, PE.      ROS:   GEN: normal without any fever, night sweats or weight loss  HEENT: normal with no HA's, sore throat, stiff neck, changes in vision  CV: normal with no CP, SOB, PND, AKHTAR or orthopnea  PULM: normal with no SOB, cough, hemoptysis, sputum or pleuritic pain  GI: normal with no abdominal pain, nausea, vomiting, constipation, diarrhea, melanotic stools, BRBPR, or hematemesis  : normal with no hematuria, dysuria  BREAST: normal with no mass, discharge, pain  SKIN: see HPI     Past Medical History:   Diagnosis Date    Benign essential HTN     Hearing loss     Hypercholesterolemia     Laryngopharyngeal reflux (LPR)     Rheumatoid arthritis     Wears dentures      No past surgical history on file.    Review of patient's allergies indicates:   Allergen Reactions    Alendronate      Upset stomach and GI reflux    Duloxetine      Other reaction(s): rash  Bruising  itching       Lipase-protease-amylase      Other reaction(s): Unknown    Shellfish containing products      Other reaction(s): Unknown           Current Outpatient Medications:     acetaminophen (TYLENOL) 500 MG tablet, Take 500 mg by mouth every 6 (six) hours as needed for Pain., Disp: , Rfl:     apixaban (ELIQUIS) 5 mg (74 tabs) DsPk, Take by mouth. For the first 7 days take two 5 mg tablets twice daily.  After 7 days take one 5 mg tablet twice daily., Disp: , Rfl:     azelastine (ASTELIN) 137 mcg (0.1 %) nasal spray, 1 spray (137 mcg total) by Nasal route 2 (two) times daily. (Patient not taking: Reported on 10/3/2019), Disp: 90 mL, Rfl: 3    fluticasone (FLONASE) 50 mcg/actuation nasal spray, 1 spray (50 mcg total) by Each Nare route 2 (two) times daily., Disp: 3 Bottle, Rfl: 4    furosemide (LASIX) 20 MG tablet, Take 40 mg by mouth once daily. , Disp: , Rfl:     hydroxychloroquine (PLAQUENIL) 200 mg tablet, Take 200 mg by mouth 2 (two) times daily., Disp: , Rfl:     ipratropium (ATROVENT) 0.03 % nasal spray, 2 sprays per nare as needed four times daily for sniffles (Patient not taking: Reported on 10/3/2019), Disp: 90 mL, Rfl: 4    losartan (COZAAR) 100 MG tablet, Take 100 mg by mouth once daily., Disp: , Rfl:     lovastatin (MEVACOR) 20 MG tablet, Take 20 mg by mouth every evening., Disp: , Rfl:     metoprolol succinate (TOPROL-XL) 100 MG 24 hr tablet, Take 100 mg by mouth once daily., Disp: , Rfl:     montelukast (SINGULAIR) 10 mg tablet, Take 10 mg by mouth once daily., Disp: , Rfl:     omeprazole (PRILOSEC) 20 MG capsule, Take 20 mg by mouth 2 (two) times daily., Disp: , Rfl:           Objective:   Vitals:  Blood pressure (!) 136/58, pulse 68, temperature 97.4 °F (36.3 °C), resp. rate 18, weight 60.1 kg (132 lb 8 oz).    Physical Examination:   GEN: no apparent distress, comfortable  HEAD: atraumatic and normocephalic  EYES: no pallor, no icterus  ENT:  no pharyngeal erythema, external ears WNL; no nasal  discharge; no thrush  NECK: no masses, thyroid normal, trachea midline, no LAD/LN's, supple  CV: RRR with no murmur; normal pulse; normal S1 and S2; no pedal edema  CHEST: Normal respiratory effort; CTAB; normal breath sounds; no wheeze or crackles  ABDOM: nontender and nondistended; soft;  no rebound/guarding  MUSC/Skeletal: ROM normal; no crepitus; joints normal; no deformities   EXTREM: L & R leg with varicose veins and 2+ edema.  SKIN: hematoma changes resolved  : no cvat  NEURO: grossly intact; motor/sensory WNL;  no tremors  PSYCH: normal mood, affect and behavior  LYMPH: normal cervical, supraclavicular, axillary and inguinal LN.  \    Assessment:   (1) 89 y.o. female with diagnosis of R leg femoral vein DVT, 5/26/19.  U/S DVT resolved.    (2)Hgb 12.2, WBC 12,300, plt cnt 159,000.    Continue Eliquis 5 mg BID. Prophylaxis.    (3)R leg hematoma after hitting leg, resolved.    RTC in 6 months.    Given the extent of her varicose veins and edema of the legs, I would continue on Eliquis chronically.         I have explained and the patient understands all of  the current recommendation(s). I have answered all of their questions to the best of my ability and to their complete satisfaction.

## 2019-12-12 ENCOUNTER — TELEPHONE (OUTPATIENT)
Dept: FAMILY MEDICINE | Facility: CLINIC | Age: 84
End: 2019-12-12

## 2019-12-12 DIAGNOSIS — M19.90 ARTHRITIS: ICD-10-CM

## 2019-12-12 DIAGNOSIS — M25.511 CHRONIC RIGHT SHOULDER PAIN: Primary | ICD-10-CM

## 2019-12-12 DIAGNOSIS — G89.29 CHRONIC RIGHT SHOULDER PAIN: Primary | ICD-10-CM

## 2019-12-14 ENCOUNTER — OUTSIDE PLACE OF SERVICE (OUTPATIENT)
Dept: ADMINISTRATIVE | Facility: HOSPITAL | Age: 84
End: 2019-12-14
Payer: MEDICARE

## 2019-12-14 DIAGNOSIS — M17.11 OSTEOARTHRITIS OF RIGHT KNEE: ICD-10-CM

## 2019-12-14 PROCEDURE — G0180 MD CERTIFICATION HHA PATIENT: HCPCS | Mod: S$GLB,,, | Performed by: FAMILY MEDICINE

## 2019-12-14 PROCEDURE — G0180 PR HOME HEALTH MD CERTIFICATION: ICD-10-PCS | Mod: S$GLB,,, | Performed by: FAMILY MEDICINE

## 2019-12-16 ENCOUNTER — TELEPHONE (OUTPATIENT)
Dept: FAMILY MEDICINE | Facility: CLINIC | Age: 84
End: 2019-12-16

## 2019-12-16 DIAGNOSIS — M25.511 CHRONIC RIGHT SHOULDER PAIN: Primary | ICD-10-CM

## 2019-12-16 DIAGNOSIS — G89.29 CHRONIC RIGHT SHOULDER PAIN: Primary | ICD-10-CM

## 2019-12-16 NOTE — TELEPHONE ENCOUNTER
Pt daughter called in stating that she had called Friday evening and was asking about pt coming in to get an injection in her shoulder and her backside for her shoulder pain. Pt daughter states that pt sees Dr. Delgado her rheumatologist for these injections but is not available to do them at this time but states that he can call in a rx and if okay with Dr. Martínez we can give the injection. Pt daughter requesting a call back on this matter.

## 2019-12-16 NOTE — TELEPHONE ENCOUNTER
"I occasionally do injections in the shoulder, but would need an Xray first as I am not familiar with her anatomy. I also do not do injections in the "backside" (I assume s he means trigger point) injections  "

## 2019-12-16 NOTE — TELEPHONE ENCOUNTER
Spoke with Cecile - Cecile informed that per Dr. Martínez she would need a xray of pt shoulder because she's isn't familiar with her anatomy. Cecile also informed that per Dr. Martínez she doesn't do trigger point injections into the backside. Cecile asked where would she go and get xray and would it only be once. Pt only gets the injection every 6 months so if she gets the xray when can she come in and get the injection for the shoulder. Cecile requesting xray orders for right shoulder for injection

## 2019-12-17 NOTE — TELEPHONE ENCOUNTER
Spoke with Cecile Huber informed that the xray orders were sent to [facility]. Cecile stated that this was good new and pt actually was able to get her shots today but she will keep this information in mind for future reference.

## 2019-12-18 ENCOUNTER — HOSPITAL ENCOUNTER (EMERGENCY)
Facility: HOSPITAL | Age: 84
Discharge: HOME | End: 2019-12-18
Attending: EMERGENCY MEDICINE
Payer: MEDICARE

## 2019-12-18 ENCOUNTER — PATIENT MESSAGE (OUTPATIENT)
Dept: FAMILY MEDICINE | Facility: CLINIC | Age: 84
End: 2019-12-18

## 2019-12-18 VITALS
HEART RATE: 50 BPM | HEIGHT: 56 IN | BODY MASS INDEX: 29.25 KG/M2 | TEMPERATURE: 98 F | DIASTOLIC BLOOD PRESSURE: 58 MMHG | RESPIRATION RATE: 18 BRPM | OXYGEN SATURATION: 100 % | SYSTOLIC BLOOD PRESSURE: 130 MMHG | WEIGHT: 130 LBS

## 2019-12-18 DIAGNOSIS — S09.90XA CLOSED HEAD INJURY, INITIAL ENCOUNTER: Primary | ICD-10-CM

## 2019-12-18 PROCEDURE — 99284 EMERGENCY DEPT VISIT MOD MDM: CPT | Mod: 25

## 2019-12-18 NOTE — DISCHARGE INSTRUCTIONS
You have a 1.4 cm extra-axial lesion noted and brain CT and would need MRI of brain for further evaluation of this lesion.  Return to emergency department for worsening symptoms or any problems.  Fall precautions.  Use ice pack to area of hematoma.  Return to emergency department for worsening symptoms or any problems

## 2019-12-18 NOTE — ED TRIAGE NOTES
Pt states she was moving a painting and tripped and hit a plant stand with the front of her head and the wall with the side of her head

## 2019-12-18 NOTE — TELEPHONE ENCOUNTER
Cecile sent message via portal inquiring on where to have xray done. Cecile informed [facility] via phone call

## 2019-12-18 NOTE — ED PROVIDER NOTES
Encounter Date: 12/18/2019       History     Chief Complaint   Patient presents with    Fall     Head Injury     [age] year old female presented emergency department after a fall.  Patient was at a paint class and turned back and slipped and fell and hit her head.  Patient said she hit a flower pot on her forehead and has a hematoma on the forehead.  Patient denies loss of consciousness.  Denies headache however patient has pain where she has a hematoma in the frontal area.  Patient on blood thinners.  Patient had previous history of blood clots.  Denies fever chills nausea vomiting or chest pain or shortness of breath        Review of patient's allergies indicates:   Allergen Reactions    Alendronate      Upset stomach and GI reflux    Lipase-protease-amylase      Other reaction(s): Unknown    Shellfish containing products      Other reaction(s): Unknown     Past Medical History:   Diagnosis Date    Benign essential HTN     Hearing loss     Hypercholesterolemia     Laryngopharyngeal reflux (LPR)     Rheumatoid arthritis     Wears dentures      No past surgical history on file.  Family History   Problem Relation Age of Onset    Atopy Neg Hx      Social History     Tobacco Use    Smoking status: Never Smoker    Smokeless tobacco: Never Used   Substance Use Topics    Alcohol use: Yes    Drug use: No     Review of Systems   Constitutional: Negative.    HENT: Negative.    Eyes: Negative.    Respiratory: Negative.    Cardiovascular: Negative.  Negative for chest pain.   Gastrointestinal: Negative.    Endocrine: Negative.    Genitourinary: Negative.    Musculoskeletal: Negative.    Skin: Negative.         Hematoma to forehead   Allergic/Immunologic: Negative.    Neurological: Negative.    Hematological: Negative.    Psychiatric/Behavioral: Negative.    All other systems reviewed and are negative.      Physical Exam     Initial Vitals [12/18/19 1524]   BP Pulse Resp Temp SpO2   (!) 198/77 (!) 56 20  97.8 °F (36.6 °C) 99 %      MAP       --         Physical Exam    Nursing note and vitals reviewed.  Constitutional: She appears well-developed and well-nourished.   HENT:   Head: Normocephalic.   Nose: Nose normal.   Mouth/Throat: Oropharynx is clear and moist.   Hematoma noted on the forehead   Eyes: Conjunctivae and EOM are normal. Pupils are equal, round, and reactive to light.   Neck: Normal range of motion. Neck supple. No thyromegaly present. No tracheal deviation present. No JVD present.   Cardiovascular: Normal rate, regular rhythm, normal heart sounds and intact distal pulses.   No murmur heard.  Pulmonary/Chest: Breath sounds normal. No stridor. No respiratory distress. She has no wheezes. She has no rales.   Abdominal: Soft. Bowel sounds are normal. There is no tenderness.   Musculoskeletal: Normal range of motion. She exhibits no edema or tenderness.   No cervical or thoracic or lumbar spine tenderness   Neurological: She is alert and oriented to person, place, and time. She has normal strength. No cranial nerve deficit or sensory deficit. GCS score is 15. GCS eye subscore is 4. GCS verbal subscore is 5. GCS motor subscore is 6.   Skin: Skin is warm. Capillary refill takes less than 2 seconds.   Psychiatric: She has a normal mood and affect. Thought content normal.         ED Course   Procedures  Labs Reviewed - No data to display       Imaging Results    None          Medical Decision Making:   Differential Diagnosis:   Patient presented emergency department after a fall with a hematoma on the forehead.  CT scan does not show evidence of bleeding.  There is a 1.4 cm extra-axial lesion on the left side noted on CT and MRI recommended and this recommendation was passed on to the patient's family member.  Patient's daughter-in-law is in the room and a recommended that she follows up with primary care for MRI of her brain.  As patient feeling well discharged with instructions and follow-up  Clinical Tests:    Radiological Study: Reviewed                                 Clinical Impression:       ICD-10-CM ICD-9-CM   1. Closed head injury, initial encounter S09.90XA 959.01                             Elidia Mckeon MD  12/18/19 1323

## 2019-12-19 ENCOUNTER — PATIENT MESSAGE (OUTPATIENT)
Dept: FAMILY MEDICINE | Facility: CLINIC | Age: 84
End: 2019-12-19

## 2019-12-19 DIAGNOSIS — M25.512 CHRONIC LEFT SHOULDER PAIN: Primary | ICD-10-CM

## 2019-12-19 DIAGNOSIS — G89.29 CHRONIC LEFT SHOULDER PAIN: Primary | ICD-10-CM

## 2020-01-07 ENCOUNTER — EXTERNAL HOME HEALTH (OUTPATIENT)
Dept: HOME HEALTH SERVICES | Facility: HOSPITAL | Age: 85
End: 2020-01-07

## 2020-01-20 ENCOUNTER — HOSPITAL ENCOUNTER (OUTPATIENT)
Dept: RADIOLOGY | Facility: HOSPITAL | Age: 85
Discharge: HOME | End: 2020-01-20
Attending: FAMILY MEDICINE
Payer: MEDICARE

## 2020-01-20 ENCOUNTER — TELEPHONE (OUTPATIENT)
Dept: HOME HEALTH SERVICES | Facility: HOSPITAL | Age: 85
End: 2020-01-20

## 2020-01-20 DIAGNOSIS — G89.29 CHRONIC RIGHT SHOULDER PAIN: ICD-10-CM

## 2020-01-20 DIAGNOSIS — M25.511 CHRONIC RIGHT SHOULDER PAIN: ICD-10-CM

## 2020-01-20 DIAGNOSIS — M25.512 CHRONIC LEFT SHOULDER PAIN: ICD-10-CM

## 2020-01-20 DIAGNOSIS — G89.29 CHRONIC LEFT SHOULDER PAIN: ICD-10-CM

## 2020-01-20 PROCEDURE — 73030 X-RAY EXAM OF SHOULDER: CPT | Mod: TC,50,PO

## 2020-01-24 DIAGNOSIS — I10 ESSENTIAL HYPERTENSION: Primary | ICD-10-CM

## 2020-01-24 RX ORDER — LOSARTAN POTASSIUM 100 MG/1
100 TABLET ORAL DAILY
Qty: 90 TABLET | Refills: 0 | Status: SHIPPED | OUTPATIENT
Start: 2020-01-24 | End: 2020-05-11 | Stop reason: SDUPTHER

## 2020-01-24 NOTE — TELEPHONE ENCOUNTER
----- Message from Elsa Cobb sent at 1/24/2020 10:58 AM CST -----  lastartan   Pharm [facility] [place]   Pt daughter mark [phone]

## 2020-01-24 NOTE — TELEPHONE ENCOUNTER
The patient's prescription has been approved and sent to   [facility] - [place], [place] - [address] [address]  [id number] [facility]  [place] [address]  Phone: [phone] Fax: [phone]

## 2020-02-03 ENCOUNTER — TELEPHONE (OUTPATIENT)
Dept: FAMILY MEDICINE | Facility: CLINIC | Age: 85
End: 2020-02-03

## 2020-02-03 RX ORDER — OMEPRAZOLE 20 MG/1
20 CAPSULE, DELAYED RELEASE ORAL 2 TIMES DAILY
Qty: 180 CAPSULE | Refills: 1 | Status: CANCELLED | OUTPATIENT
Start: 2020-02-03 | End: 2020-05-03

## 2020-02-03 RX ORDER — LOVASTATIN 20 MG/1
20 TABLET ORAL NIGHTLY
Qty: 90 TABLET | Refills: 1 | Status: CANCELLED | OUTPATIENT
Start: 2020-02-03 | End: 2020-05-03

## 2020-02-03 NOTE — TELEPHONE ENCOUNTER
----- Message from Elsa Cobb sent at 2/3/2020  3:39 PM CST -----  Refills on lovastatin   Omeprazole   Pharm [facility] [place]   Pt [phone]

## 2020-02-04 ENCOUNTER — OFFICE VISIT (OUTPATIENT)
Dept: FAMILY MEDICINE | Facility: CLINIC | Age: 85
End: 2020-02-04
Payer: MEDICARE

## 2020-02-04 DIAGNOSIS — M19.90 ARTHRITIS: ICD-10-CM

## 2020-02-04 DIAGNOSIS — K21.9 GASTROESOPHAGEAL REFLUX DISEASE WITHOUT ESOPHAGITIS: ICD-10-CM

## 2020-02-04 DIAGNOSIS — Z99.89 USES ROLLER WALKER: ICD-10-CM

## 2020-02-04 DIAGNOSIS — I10 ESSENTIAL HYPERTENSION: Primary | ICD-10-CM

## 2020-02-04 DIAGNOSIS — E78.2 MIXED HYPERLIPIDEMIA: ICD-10-CM

## 2020-02-04 PROCEDURE — 99214 PR OFFICE/OUTPT VISIT, EST, LEVL IV, 30-39 MIN: ICD-10-PCS | Mod: S$GLB,,, | Performed by: FAMILY MEDICINE

## 2020-02-04 PROCEDURE — 99214 OFFICE O/P EST MOD 30 MIN: CPT | Mod: S$GLB,,, | Performed by: FAMILY MEDICINE

## 2020-02-04 RX ORDER — LOVASTATIN 20 MG/1
20 TABLET ORAL NIGHTLY
Qty: 90 TABLET | Refills: 1 | Status: SHIPPED | OUTPATIENT
Start: 2020-02-04 | End: 2020-09-28 | Stop reason: SDUPTHER

## 2020-02-04 NOTE — PROGRESS NOTES
SUBJECTIVE:    Patient ID: Mai Suarez is a 89 y.o. female.    Chief Complaint: Hypertension (check up)    Pt here to checkup on HTN, GERD, RA,     BP is doing ok. Denies CP/SOB.    RA is doing well on plaquenil, has seen Dr. Delgado recently and had shots in each shoulder.  Had to take tylenol before the shots but no longer.    Still has not had a change to see Dr. Chairez, waiting on her daughter in law to get back in town    Denies heartburn, takes omeprazole daily.  Allergies are controlled flonase and astepro.      Hasn't lost any weight since last visit, states she is never hungry.   Denies any falls since last visit. Uses rolling walker.  Has been to physical therapy and can get up on her own.         No visits with results within 6 Month(s) from this visit.   Latest known visit with results is:   No results found for any previous visit.       Past Medical History:   Diagnosis Date    Benign essential HTN     Hearing loss     Hypercholesterolemia     Laryngopharyngeal reflux (LPR)     Rheumatoid arthritis     Wears dentures      History reviewed. No pertinent surgical history.  Family History   Problem Relation Age of Onset    Atopy Neg Hx        Marital Status:   Alcohol History:  reports that she drinks alcohol.  Tobacco History:  reports that she has never smoked. She has never used smokeless tobacco.  Drug History:  reports that she does not use drugs.    Review of patient's allergies indicates:   Allergen Reactions    Alendronate      Upset stomach and GI reflux    Lipase-protease-amylase      Other reaction(s): Unknown    Shellfish containing products      Other reaction(s): Unknown       Current Outpatient Medications:     apixaban (ELIQUIS) 5 mg (74 tabs) DsPk, Take by mouth. For the first 7 days take two 5 mg tablets twice daily.  After 7 days take one 5 mg tablet twice daily., Disp: , Rfl:     furosemide (LASIX) 20 MG tablet, Take 40 mg by mouth once daily. , Disp: , Rfl:      hydroxychloroquine (PLAQUENIL) 200 mg tablet, Take 200 mg by mouth 2 (two) times daily., Disp: , Rfl:     losartan (COZAAR) 100 MG tablet, Take 1 tablet (100 mg total) by mouth once daily., Disp: 90 tablet, Rfl: 0    lovastatin (MEVACOR) 20 MG tablet, Take 1 tablet (20 mg total) by mouth every evening., Disp: 90 tablet, Rfl: 1    metoprolol succinate (TOPROL-XL) 100 MG 24 hr tablet, Take 100 mg by mouth once daily., Disp: , Rfl:     omeprazole (PRILOSEC) 20 MG capsule, Take 20 mg by mouth 2 (two) times daily., Disp: , Rfl:     acetaminophen (TYLENOL) 500 MG tablet, Take 500 mg by mouth every 6 (six) hours as needed for Pain., Disp: , Rfl:     azelastine (ASTELIN) 137 mcg (0.1 %) nasal spray, 1 spray (137 mcg total) by Nasal route 2 (two) times daily. (Patient not taking: Reported on 10/3/2019), Disp: 90 mL, Rfl: 3    fluticasone (FLONASE) 50 mcg/actuation nasal spray, 1 spray (50 mcg total) by Each Nare route 2 (two) times daily., Disp: 3 Bottle, Rfl: 4    ipratropium (ATROVENT) 0.03 % nasal spray, 2 sprays per nare as needed four times daily for sniffles (Patient not taking: Reported on 10/3/2019), Disp: 90 mL, Rfl: 4    montelukast (SINGULAIR) 10 mg tablet, Take 10 mg by mouth once daily., Disp: , Rfl:     Review of Systems   Constitutional: Negative for appetite change, fatigue, fever and unexpected weight change.   Respiratory: Negative for cough, chest tightness, shortness of breath and wheezing.    Cardiovascular: Negative for chest pain and leg swelling.   Gastrointestinal: Negative for abdominal pain, constipation, nausea and vomiting.        -heartburn   Genitourinary: Negative for difficulty urinating, dysuria, frequency and urgency.   Musculoskeletal: Negative for arthralgias, back pain, myalgias and neck pain.   Skin: Negative for rash.   Allergic/Immunologic: Positive for environmental allergies.   Neurological: Negative for dizziness, weakness, numbness and headaches.   Hematological:  "Does not bruise/bleed easily.   Psychiatric/Behavioral: Negative for dysphoric mood, sleep disturbance and suicidal ideas. The patient is not nervous/anxious.    All other systems reviewed and are negative.         Objective:      Vitals:    02/04/20 0954   BP: (P) 110/66   Pulse: (!) (P) 56   SpO2: (P) 98%   Weight: (P) 58.7 kg (129 lb 6.4 oz)   Height: (P) 4' 8" (1.422 m)     Body mass index is 29.01 kg/m² (pended).     Physical Exam   Constitutional: She is oriented to person, place, and time. She appears well-developed and well-nourished. No distress.   overweight   HENT:   Head: Normocephalic and atraumatic.   Neck: Neck supple. No thyromegaly present.   Cardiovascular: Normal rate and normal heart sounds. Exam reveals no friction rub.   No murmur heard.  Pulmonary/Chest: Effort normal and breath sounds normal. No stridor. She has no wheezes. She has no rales.   Abdominal: Soft. Bowel sounds are normal. She exhibits no distension. There is no tenderness.   Musculoskeletal: She exhibits edema (trace LLE).   Lymphadenopathy:     She has no cervical adenopathy.   Neurological: She is alert and oriented to person, place, and time.   Skin: Skin is warm and dry. No bruising and no rash noted.   Psychiatric: She has a normal mood and affect. Her behavior is normal. Judgment and thought content normal. Cognition and memory are normal. She is attentive.         Assessment:       1. Essential hypertension    2. Mixed hyperlipidemia    3. Gastroesophageal reflux disease without esophagitis    4. Arthritis    5. Uses roller walker         Plan:       Essential hypertension  Comments:  Blood pressure controlled. Will continue to monitor  Orders:  -     Microalbumin/creatinine urine ratio; Future; Expected date: 05/04/2020  -     Urinalysis; Future; Expected date: 05/04/2020    Mixed hyperlipidemia  Comments:  Active. Lovastatin refilled today. Labs ordered for monitoring of chronic condition  Orders:  -     lovastatin " (MEVACOR) 20 MG tablet; Take 1 tablet (20 mg total) by mouth every evening.  Dispense: 90 tablet; Refill: 1  -     Lipid panel; Future; Expected date: 05/04/2020  -     Microalbumin/creatinine urine ratio; Future; Expected date: 05/04/2020  -     Urinalysis; Future; Expected date: 05/04/2020    Gastroesophageal reflux disease without esophagitis  Comments:  Controlled. Will continue to monitor.    Arthritis  Comments:  Controlled, on plaquenil, and tylenol, prn. To continue to follow aleena Delgado    Uses roller walker  Comments:  Encouraged to use walker in order to prevent falls.      Follow up in about 4 months (around 6/4/2020) for HTN, GERD.

## 2020-02-10 PROBLEM — M19.90 ARTHRITIS: Status: ACTIVE | Noted: 2020-02-10

## 2020-02-13 DIAGNOSIS — K21.9 GASTROESOPHAGEAL REFLUX DISEASE WITHOUT ESOPHAGITIS: Primary | ICD-10-CM

## 2020-02-13 RX ORDER — OMEPRAZOLE 20 MG/1
20 CAPSULE, DELAYED RELEASE ORAL 2 TIMES DAILY
Qty: 180 CAPSULE | Refills: 1 | Status: SHIPPED | OUTPATIENT
Start: 2020-02-13 | End: 2020-07-08 | Stop reason: SDUPTHER

## 2020-02-13 NOTE — TELEPHONE ENCOUNTER
----- Message from Vivinae Funez sent at 2/13/2020 10:37 AM CST -----  Refill for Omeprazole. [facility] [address]. Cecile @ [phone]

## 2020-02-13 NOTE — TELEPHONE ENCOUNTER
The patient's prescription has been approved and sent to   [facility] - [place] - [address] [address]  [id number] [facility]  [place] [address]  Phone: [phone] Fax: [phone]

## 2020-02-19 ENCOUNTER — TELEPHONE (OUTPATIENT)
Dept: FAMILY MEDICINE | Facility: CLINIC | Age: 85
End: 2020-02-19

## 2020-03-03 ENCOUNTER — EXTERNAL HOME HEALTH (OUTPATIENT)
Dept: HOME HEALTH SERVICES | Facility: HOSPITAL | Age: 85
End: 2020-03-03

## 2020-03-05 ENCOUNTER — TELEPHONE (OUTPATIENT)
Dept: FAMILY MEDICINE | Facility: CLINIC | Age: 85
End: 2020-03-05

## 2020-05-11 ENCOUNTER — PATIENT MESSAGE (OUTPATIENT)
Dept: FAMILY MEDICINE | Facility: CLINIC | Age: 85
End: 2020-05-11

## 2020-05-11 DIAGNOSIS — I10 ESSENTIAL HYPERTENSION: ICD-10-CM

## 2020-05-11 RX ORDER — LOSARTAN POTASSIUM 100 MG/1
100 TABLET ORAL DAILY
Qty: 90 TABLET | Refills: 0 | Status: SHIPPED | OUTPATIENT
Start: 2020-05-11 | End: 2020-07-26 | Stop reason: SDUPTHER

## 2020-05-11 NOTE — TELEPHONE ENCOUNTER
The patient's prescription has been approved and sent to   [facility] - [place] - [address] [address]  [id number]  [place] [address]  Phone: [phone] Fax: [phone]

## 2020-05-15 ENCOUNTER — PATIENT MESSAGE (OUTPATIENT)
Dept: FAMILY MEDICINE | Facility: CLINIC | Age: 85
End: 2020-05-15

## 2020-07-15 ENCOUNTER — OFFICE VISIT (OUTPATIENT)
Dept: FAMILY MEDICINE | Facility: CLINIC | Age: 85
End: 2020-07-15
Payer: MEDICARE

## 2020-07-15 DIAGNOSIS — M06.9 RHEUMATOID ARTHRITIS, INVOLVING UNSPECIFIED SITE, UNSPECIFIED RHEUMATOID FACTOR PRESENCE: ICD-10-CM

## 2020-07-15 DIAGNOSIS — Z99.89 USES ROLLER WALKER: ICD-10-CM

## 2020-07-15 DIAGNOSIS — I10 ESSENTIAL HYPERTENSION: ICD-10-CM

## 2020-07-15 DIAGNOSIS — K21.9 GASTROESOPHAGEAL REFLUX DISEASE WITHOUT ESOPHAGITIS: Primary | ICD-10-CM

## 2020-07-15 PROCEDURE — 99213 OFFICE O/P EST LOW 20 MIN: CPT | Mod: 95,,, | Performed by: FAMILY MEDICINE

## 2020-07-15 PROCEDURE — 99213 PR OFFICE/OUTPT VISIT, EST, LEVL III, 20-29 MIN: ICD-10-PCS | Mod: 95,,, | Performed by: FAMILY MEDICINE

## 2020-07-15 RX ORDER — OMEPRAZOLE 20 MG/1
20 CAPSULE, DELAYED RELEASE ORAL 2 TIMES DAILY
Qty: 180 CAPSULE | Refills: 1 | Status: SHIPPED | OUTPATIENT
Start: 2020-07-15 | End: 2021-01-04 | Stop reason: SDUPTHER

## 2020-07-15 NOTE — PROGRESS NOTES
Subjective:        The chief complaint leading to consultation is: HTN, GERD, RA  The patient location is:  Home  Visit type: Virtual visit with synchronous audio/video or audio only  This was a video visit in lieu of in-person visit due to the coronavirus emergency. Patient acknowledged and consented to the video visit encounter.     Pt here to checkup on HTN, GERD, RA,     BP is doing ok. Denies CP/SOB.    RA is doing well on plaquenil, Had shots in her but by Dr. Delgado recently and no longer having any pain.     Still has not had a change to see Dr. Chairez, waiting on her daughter in law to get back in town    Denies heartburn, takes omeprazole daily.  Allergies are controlled flonase and astepro, doing about the same.      Has not been able to go get labs done    Has lost about 12 pounds since last visit, still eating, but byself her now.     Denies any falls since last visit. Uses rolling walker.       History reviewed. No pertinent surgical history.  Past Medical History:   Diagnosis Date    Benign essential HTN     Hearing loss     Hypercholesterolemia     Laryngopharyngeal reflux (LPR)     Rheumatoid arthritis     Wears dentures      Family History   Problem Relation Age of Onset    Atopy Neg Hx         Social History:   Marital Status:   Alcohol History:  reports current alcohol use.  Tobacco History:  reports that she has never smoked. She has never used smokeless tobacco.  Drug History:  reports no history of drug use.    Review of patient's allergies indicates:   Allergen Reactions    Alendronate      Upset stomach and GI reflux    Lipase-protease-amylase      Other reaction(s): Unknown    Shellfish containing products      Other reaction(s): Unknown       Current Outpatient Medications   Medication Sig Dispense Refill    acetaminophen (TYLENOL) 500 MG tablet Take 500 mg by mouth every 6 (six) hours as needed for Pain.      apixaban (ELIQUIS) 5 mg (74 tabs) DsPk Take by mouth.  For the first 7 days take two 5 mg tablets twice daily.  After 7 days take one 5 mg tablet twice daily.      fluticasone (FLONASE) 50 mcg/actuation nasal spray 1 spray (50 mcg total) by Each Nare route 2 (two) times daily. 3 Bottle 4    furosemide (LASIX) 20 MG tablet Take 40 mg by mouth once daily.       hydroxychloroquine (PLAQUENIL) 200 mg tablet Take 200 mg by mouth 2 (two) times daily.      losartan (COZAAR) 100 MG tablet Take 1 tablet (100 mg total) by mouth once daily. 90 tablet 0    lovastatin (MEVACOR) 20 MG tablet Take 1 tablet (20 mg total) by mouth every evening. 90 tablet 1    metoprolol succinate (TOPROL-XL) 100 MG 24 hr tablet Take 100 mg by mouth once daily.      montelukast (SINGULAIR) 10 mg tablet Take 10 mg by mouth once daily.      omeprazole (PRILOSEC) 20 MG capsule Take 1 capsule (20 mg total) by mouth 2 (two) times daily. 180 capsule 1     No current facility-administered medications for this visit.        Review of Systems   Constitutional: Negative for appetite change, fatigue, fever and unexpected weight change.   Respiratory: Negative for cough, chest tightness, shortness of breath and wheezing.    Cardiovascular: Negative for chest pain and leg swelling.   Gastrointestinal: Negative for abdominal pain, constipation, nausea and vomiting.        -heartburn   Genitourinary: Negative for difficulty urinating, dysuria, frequency and urgency.   Musculoskeletal: Positive for arthralgias. Negative for back pain, myalgias and neck pain.   Skin: Negative for rash.   Neurological: Negative for dizziness, weakness, numbness and headaches.   Hematological: Does not bruise/bleed easily.   Psychiatric/Behavioral: Negative for dysphoric mood, sleep disturbance and suicidal ideas. The patient is not nervous/anxious.    All other systems reviewed and are negative.        Objective:        Physical Exam:   Physical Exam         Assessment:       1. Gastroesophageal reflux disease without esophagitis     2. Essential hypertension    3. Rheumatoid arthritis, involving unspecified site, unspecified rheumatoid factor presence    4. Uses roller walker      Plan:   Gastroesophageal reflux disease without esophagitis  Comments:  Controlled. Will continue to monitor  Orders:  -     omeprazole (PRILOSEC) 20 MG capsule; Take 1 capsule (20 mg total) by mouth 2 (two) times daily.  Dispense: 180 capsule; Refill: 1    Essential hypertension  Comments:  Active. To continue to monitor.  Will continue to monitor.    Rheumatoid arthritis, involving unspecified site, unspecified rheumatoid factor presence  Comments:  Controlled. To cont follow with Rheum. Will continue to monitor symptoms.    Uses roller walker  Comments:  Pt to continue to use walker in order to prevent falls, injury      Follow up in about 4 months (around 11/15/2020) for GERD, HTN, RA.    Total time spent with patient: 10 min    Each patient to whom he or she provides medical services by telemedicine is:  (1) informed of the relationship between the physician and patient and the respective role of any other health care provider with respect to management of the patient; and (2) notified that he or she may decline to receive medical services by telemedicine and may withdraw from such care at any time.    This note was created using [email or web address] voice recognition software that occasionally misinterprets phrases or words.

## 2020-09-23 ENCOUNTER — TELEPHONE (OUTPATIENT)
Dept: FAMILY MEDICINE | Facility: CLINIC | Age: 85
End: 2020-09-23

## 2020-09-23 NOTE — TELEPHONE ENCOUNTER
Spoke with Cecile Suarez - Cecile states that a while back Dr. Marquis in [place] approved Dr. Martínez to give pt a shot in her shoulder for pain because the drive to [place] was so far. Pt is hurting in shoulder again and is needing a shot. Please advise.YARELY

## 2020-09-23 NOTE — TELEPHONE ENCOUNTER
"----- Message from Rebecca Emery sent at 9/23/2020 10:23 AM CDT -----  Vm @ 10:17 pt's shoulder is hurting her "very bad" stated it was approved for Dr. Martínez to give an injection.  # [phone]     "

## 2020-09-28 ENCOUNTER — OFFICE VISIT (OUTPATIENT)
Dept: FAMILY MEDICINE | Facility: CLINIC | Age: 85
End: 2020-09-28
Payer: MEDICARE

## 2020-09-28 ENCOUNTER — PATIENT MESSAGE (OUTPATIENT)
Dept: FAMILY MEDICINE | Facility: CLINIC | Age: 85
End: 2020-09-28

## 2020-09-28 VITALS
OXYGEN SATURATION: 99 % | HEART RATE: 59 BPM | SYSTOLIC BLOOD PRESSURE: 162 MMHG | TEMPERATURE: 98 F | WEIGHT: 119.19 LBS | HEIGHT: 56 IN | DIASTOLIC BLOOD PRESSURE: 70 MMHG | BODY MASS INDEX: 26.81 KG/M2

## 2020-09-28 DIAGNOSIS — G89.29 CHRONIC RIGHT SHOULDER PAIN: Primary | ICD-10-CM

## 2020-09-28 DIAGNOSIS — E78.2 MIXED HYPERLIPIDEMIA: ICD-10-CM

## 2020-09-28 DIAGNOSIS — Z23 FLU VACCINE NEED: ICD-10-CM

## 2020-09-28 DIAGNOSIS — M25.511 CHRONIC RIGHT SHOULDER PAIN: Primary | ICD-10-CM

## 2020-09-28 PROCEDURE — G0008 PR ADMIN INFLUENZA VIRUS VAC: ICD-10-PCS | Mod: S$GLB,,, | Performed by: FAMILY MEDICINE

## 2020-09-28 PROCEDURE — 99212 PR OFFICE/OUTPT VISIT, EST, LEVL II, 10-19 MIN: ICD-10-PCS | Mod: 25,S$GLB,, | Performed by: FAMILY MEDICINE

## 2020-09-28 PROCEDURE — 20610 DRAIN/INJ JOINT/BURSA W/O US: CPT | Mod: RT,S$GLB,, | Performed by: FAMILY MEDICINE

## 2020-09-28 PROCEDURE — 90662 FLU VACCINE - QUADRIVALENT - HIGH DOSE (65+) PRESERVATIVE FREE IM: ICD-10-PCS | Mod: S$GLB,,, | Performed by: FAMILY MEDICINE

## 2020-09-28 PROCEDURE — 20610 PR DRAIN/INJECT LARGE JOINT/BURSA: ICD-10-PCS | Mod: RT,S$GLB,, | Performed by: FAMILY MEDICINE

## 2020-09-28 PROCEDURE — 99212 OFFICE O/P EST SF 10 MIN: CPT | Mod: 25,S$GLB,, | Performed by: FAMILY MEDICINE

## 2020-09-28 PROCEDURE — 90662 IIV NO PRSV INCREASED AG IM: CPT | Mod: S$GLB,,, | Performed by: FAMILY MEDICINE

## 2020-09-28 PROCEDURE — G0008 ADMIN INFLUENZA VIRUS VAC: HCPCS | Mod: S$GLB,,, | Performed by: FAMILY MEDICINE

## 2020-09-28 RX ORDER — AZELASTINE 1 MG/ML
SPRAY, METERED NASAL
COMMUNITY
Start: 2018-07-23 | End: 2021-02-09

## 2020-09-28 RX ORDER — IPRATROPIUM BROMIDE 21 UG/1
SPRAY, METERED NASAL
COMMUNITY
Start: 2018-07-23 | End: 2021-03-20

## 2020-09-28 RX ORDER — TRIAMCINOLONE ACETONIDE 40 MG/ML
20 INJECTION, SUSPENSION INTRA-ARTICULAR; INTRAMUSCULAR
Status: DISCONTINUED | OUTPATIENT
Start: 2020-09-28 | End: 2020-10-26 | Stop reason: HOSPADM

## 2020-09-28 RX ORDER — LOVASTATIN 20 MG/1
20 TABLET ORAL NIGHTLY
Qty: 90 TABLET | Refills: 1 | Status: SHIPPED | OUTPATIENT
Start: 2020-09-28

## 2020-09-28 RX ORDER — LIDOCAINE HYDROCHLORIDE 10 MG/ML
1 INJECTION, SOLUTION EPIDURAL; INFILTRATION; INTRACAUDAL; PERINEURAL
Status: DISCONTINUED | OUTPATIENT
Start: 2020-09-28 | End: 2020-10-26 | Stop reason: HOSPADM

## 2020-09-28 RX ORDER — CODEINE PHOSPHATE AND GUAIFENESIN 10; 100 MG/5ML; MG/5ML
SOLUTION ORAL
COMMUNITY
Start: 2019-08-08 | End: 2021-01-04

## 2020-09-28 RX ADMIN — LIDOCAINE HYDROCHLORIDE 1 ML: 10 INJECTION, SOLUTION EPIDURAL; INFILTRATION; INTRACAUDAL; PERINEURAL at 04:09

## 2020-09-28 RX ADMIN — TRIAMCINOLONE ACETONIDE 40 MG: 40 INJECTION, SUSPENSION INTRA-ARTICULAR; INTRAMUSCULAR at 04:09

## 2020-09-28 RX ADMIN — TRIAMCINOLONE ACETONIDE 20 MG: 40 INJECTION, SUSPENSION INTRA-ARTICULAR; INTRAMUSCULAR at 04:09

## 2020-09-28 NOTE — PROGRESS NOTES
SUBJECTIVE:    Patient ID: Mai Suarez is a 89 y.o. female.    Chief Complaint: Shoulder Pain (right - injection) and Bottles brought.    Gets pain in the right shoulder that is chronic. Pain in the shoulder is now so bad she can't do much with the arm, she has to use the left arm to pick the right arm up to do things with it. Usually gets from Dr. Bajwa. Has not gotten in awhile. Has checked with him and okay to get another injection.    Pt with difficulty hearing. Brought in by her dater.        Lab Visit on 08/27/2020   Component Date Value Ref Range Status    SARS-CoV2 (COVID-19) Qualitative P* 08/27/2020 Not Detected   Final       Past Medical History:   Diagnosis Date    Benign essential HTN     Hearing loss     Hypercholesterolemia     Laryngopharyngeal reflux (LPR)     Rheumatoid arthritis     Wears dentures      Social History     Socioeconomic History    Marital status:      Spouse name: Not on file    Number of children: Not on file    Years of education: Not on file    Highest education level: Not on file   Occupational History    Not on file   Social Needs    Financial resource strain: Not on file    Food insecurity     Worry: Not on file     Inability: Not on file    Transportation needs     Medical: Not on file     Non-medical: Not on file   Tobacco Use    Smoking status: Never Smoker    Smokeless tobacco: Never Used   Substance and Sexual Activity    Alcohol use: Yes    Drug use: No    Sexual activity: Not on file   Lifestyle    Physical activity     Days per week: Not on file     Minutes per session: Not on file    Stress: Not on file   Relationships    Social connections     Talks on phone: Not on file     Gets together: Not on file     Attends [employer] service: Not on file     Active member of club or organization: Not on file     Attends meetings of clubs or organizations: Not on file     Relationship status: Not on file   Other Topics Concern    Not on  file   Social History Narrative    Not on file     No past surgical history on file.  Family History   Problem Relation Age of Onset    Atopy Neg Hx        Review of patient's allergies indicates:   Allergen Reactions    Alendronate      Upset stomach and GI reflux    Lipase-protease-amylase      Other reaction(s): Unknown    Shellfish containing products      Other reaction(s): Unknown       Current Outpatient Medications:     acetaminophen (TYLENOL) 500 MG tablet, Take 500 mg by mouth every 6 (six) hours as needed for Pain., Disp: , Rfl:     apixaban (ELIQUIS) 5 mg (74 tabs) DsPk, Take by mouth. For the first 7 days take two 5 mg tablets twice daily.  After 7 days take one 5 mg tablet twice daily., Disp: , Rfl:     azelastine (ASTELIN) 137 mcg (0.1 %) nasal spray, , Disp: , Rfl:     fluticasone (FLONASE) 50 mcg/actuation nasal spray, 1 spray (50 mcg total) by Each Nare route 2 (two) times daily., Disp: 3 Bottle, Rfl: 4    furosemide (LASIX) 20 MG tablet, Take 40 mg by mouth once daily. , Disp: , Rfl:     hydroxychloroquine (PLAQUENIL) 200 mg tablet, Take 200 mg by mouth 2 (two) times daily., Disp: , Rfl:     losartan (COZAAR) 100 MG tablet, Take 1 tablet (100 mg total) by mouth once daily., Disp: 90 tablet, Rfl: 1    lovastatin (MEVACOR) 20 MG tablet, Take 1 tablet (20 mg total) by mouth every evening., Disp: 90 tablet, Rfl: 1    metoprolol succinate (TOPROL-XL) 100 MG 24 hr tablet, Take 100 mg by mouth once daily., Disp: , Rfl:     omeprazole (PRILOSEC) 20 MG capsule, Take 1 capsule (20 mg total) by mouth 2 (two) times daily., Disp: 180 capsule, Rfl: 1    guaifenesin-codeine 100-10 mg/5 ml (CHERATUSSIN AC)  mg/5 mL syrup, Take by mouth., Disp: , Rfl:     ipratropium (ATROVENT) 0.03 % nasal spray, 2 sprays per nare as needed four times daily for sniffles, Disp: , Rfl:     montelukast (SINGULAIR) 10 mg tablet, Take 10 mg by mouth once daily., Disp: , Rfl:     Current Facility-Administered  "Medications:     lidocaine (PF) 10 mg/ml (1%) injection 1 mL, 1 mL, , , Christin Martínez MD, 1 mL at 09/28/20 1600    triamcinolone acetonide injection 20 mg, 20 mg, , , Christin Martínez MD, 20 mg at 09/28/20 1600    Review of Systems   HENT: Positive for hearing loss.    Respiratory: Negative for shortness of breath.    Cardiovascular: Negative for chest pain.   Musculoskeletal: Positive for arthralgias (right shoulder).          Objective:      Vitals:    09/28/20 1611   BP: (!) 162/70   Pulse: (!) 59   Temp: 98 °F (36.7 °C)   SpO2: 99%   Weight: 54.1 kg (119 lb 3.2 oz)   Height: 4' 8" (1.422 m)     Physical Exam  Musculoskeletal:      Right shoulder: She exhibits tenderness (posteroir GHJ) and swelling.             Large Joint Aspiration/Injection: R glenohumeral    Date/Time: 9/28/2020 4:00 PM  Performed by: Christin Martínez MD  Authorized by: Christin Martínez MD     Consent Done?:  Yes (Verbal)  Indications:  Pain and arthritis  Site marked: the procedure site was marked    Timeout: prior to procedure the correct patient, procedure, and site was verified    Prep: patient was prepped and draped in usual sterile fashion    Local anesthesia used?: No      Details:  Needle Size:  25 G  Ultrasonic Guidance for needle placement?: No    Approach:  Posterior  Location:  Shoulder  Site:  R glenohumeral  Medications:  40 mg triamcinolone acetonide 40 mg/mL; 1 mL lidocaine (PF) 10 mg/ml (1%) 10 mg/mL (1 %)  Aspirate amount (mL):  0  Patient tolerance:  Patient tolerated the procedure well with no immediate complications     Band-aid placed over injection site      Assessment:       1. Chronic right shoulder pain    2. Mixed hyperlipidemia    3. Flu vaccine need         Plan:       Chronic right shoulder pain  Comments:  Active. Shot given. Will continue to monitor pain.  Orders:  -     Large Joint Aspiration/Injection: R glenohumeral  -     lidocaine (PF) 10 mg/ml (1%) injection 1 mL  -     triamcinolone acetonide injection 20 " mg  -     Large Joint Aspiration/Injection: R glenohumeral  -     Discontinue: lidocaine (PF) 10 mg/ml (1%) injection 1 mL  -     Discontinue: triamcinolone acetonide injection 40 mg    Mixed hyperlipidemia  Comments:  Active. Lovastatin refilled today. Labs ordered for monitoring of chronic condition  Orders:  -     lovastatin (MEVACOR) 20 MG tablet; Take 1 tablet (20 mg total) by mouth every evening.  Dispense: 90 tablet; Refill: 1    Flu vaccine need  Comments:  Flu vaccine given  Orders:  -     Influenza - Quadrivalent - High Dose (65+) (PF) (IM)      Follow up in about 3 months (around 12/28/2020) for Arthritis, Chronic Pain, HLD.        10/25/2020 Christin Martínez

## 2020-09-29 NOTE — PROCEDURES
Large Joint Aspiration/Injection: R glenohumeral    Date/Time: 9/28/2020 4:00 PM  Performed by: Christin Martínez MD  Authorized by: Christin Martínez MD     Consent Done?:  Yes (Verbal)  Indications:  Pain and arthritis  Site marked: the procedure site was marked    Timeout: prior to procedure the correct patient, procedure, and site was verified    Prep: patient was prepped and draped in usual sterile fashion    Local anesthesia used?: No      Details:  Needle Size:  25 G  Ultrasonic Guidance for needle placement?: No    Approach:  Posterior  Location:  Shoulder  Site:  R glenohumeral  Medications:  1 mL lidocaine (PF) 10 mg/ml (1%) 10 mg/mL (1 %); 20 mg triamcinolone acetonide 40 mg/mL  Patient tolerance:  Patient tolerated the procedure well with no immediate complications     Band-aid placed over injection site

## 2020-09-30 RX ORDER — LIDOCAINE HYDROCHLORIDE 10 MG/ML
1 INJECTION, SOLUTION EPIDURAL; INFILTRATION; INTRACAUDAL; PERINEURAL
Status: DISCONTINUED | OUTPATIENT
Start: 2020-09-28 | End: 2020-09-30

## 2020-09-30 RX ORDER — TRIAMCINOLONE ACETONIDE 40 MG/ML
40 INJECTION, SUSPENSION INTRA-ARTICULAR; INTRAMUSCULAR
Status: DISCONTINUED | OUTPATIENT
Start: 2020-09-28 | End: 2020-09-30

## 2020-09-30 NOTE — PROCEDURES
Large Joint Aspiration/Injection: R glenohumeral    Date/Time: 9/28/2020 4:00 PM  Performed by: Christin Martínez MD  Authorized by: Christin Martínez MD     Consent Done?:  Yes (Verbal)  Indications:  Pain and arthritis  Site marked: the procedure site was marked    Timeout: prior to procedure the correct patient, procedure, and site was verified    Prep: patient was prepped and draped in usual sterile fashion    Local anesthesia used?: No      Details:  Needle Size:  25 G  Ultrasonic Guidance for needle placement?: No    Approach:  Posterior  Location:  Shoulder  Site:  R glenohumeral  Medications:  40 mg triamcinolone acetonide 40 mg/mL; 1 mL lidocaine (PF) 10 mg/ml (1%) 10 mg/mL (1 %)  Aspirate amount (mL):  0  Patient tolerance:  Patient tolerated the procedure well with no immediate complications     Band-aid placed over injection site

## 2020-10-05 ENCOUNTER — OFFICE VISIT (OUTPATIENT)
Dept: CARDIOLOGY | Facility: CLINIC | Age: 85
End: 2020-10-05
Payer: MEDICARE

## 2020-10-05 VITALS
HEIGHT: 57 IN | BODY MASS INDEX: 25.67 KG/M2 | RESPIRATION RATE: 16 BRPM | WEIGHT: 119 LBS | DIASTOLIC BLOOD PRESSURE: 60 MMHG | SYSTOLIC BLOOD PRESSURE: 134 MMHG | HEART RATE: 74 BPM | OXYGEN SATURATION: 98 %

## 2020-10-05 DIAGNOSIS — I50.9 CONGESTIVE HEART FAILURE, UNSPECIFIED HF CHRONICITY, UNSPECIFIED HEART FAILURE TYPE: ICD-10-CM

## 2020-10-05 DIAGNOSIS — M19.90 ARTHRITIS: ICD-10-CM

## 2020-10-05 DIAGNOSIS — I10 ESSENTIAL HYPERTENSION: ICD-10-CM

## 2020-10-05 DIAGNOSIS — N18.9 CHRONIC RENAL IMPAIRMENT, UNSPECIFIED CKD STAGE: ICD-10-CM

## 2020-10-05 DIAGNOSIS — R54 ADVANCED AGE: ICD-10-CM

## 2020-10-05 DIAGNOSIS — R26.2 AMBULATORY DYSFUNCTION: ICD-10-CM

## 2020-10-05 DIAGNOSIS — E78.2 MIXED HYPERLIPIDEMIA: ICD-10-CM

## 2020-10-05 DIAGNOSIS — I50.31 ACUTE DIASTOLIC HEART FAILURE: ICD-10-CM

## 2020-10-05 DIAGNOSIS — I82.5Z9 CHRONIC DEEP VEIN THROMBOSIS (DVT) OF DISTAL VEIN OF LOWER EXTREMITY, UNSPECIFIED LATERALITY: ICD-10-CM

## 2020-10-05 DIAGNOSIS — I10 BENIGN ESSENTIAL HTN: Primary | ICD-10-CM

## 2020-10-05 DIAGNOSIS — G45.9 TIA (TRANSIENT ISCHEMIC ATTACK): ICD-10-CM

## 2020-10-05 PROCEDURE — 99214 PR OFFICE/OUTPT VISIT, EST, LEVL IV, 30-39 MIN: ICD-10-PCS | Mod: S$GLB,,, | Performed by: INTERNAL MEDICINE

## 2020-10-05 PROCEDURE — 99214 OFFICE O/P EST MOD 30 MIN: CPT | Mod: S$GLB,,, | Performed by: INTERNAL MEDICINE

## 2020-10-05 PROCEDURE — 93000 ELECTROCARDIOGRAM COMPLETE: CPT | Mod: S$GLB,,, | Performed by: INTERNAL MEDICINE

## 2020-10-05 PROCEDURE — 93000 EKG 12-LEAD: ICD-10-PCS | Mod: S$GLB,,, | Performed by: INTERNAL MEDICINE

## 2020-10-05 NOTE — PROGRESS NOTES
Subjective:   @SUBJNOHEADERBEGIN  @Patient ID:  Mai Suarez is a 89 y.o. @SEX  @ who presents for evaluation of Hypertension      HPI:  Miss Suarez is here for evaluation.  She has been doing well her breathing is good denies any shortness of breath or difficulty in breathing.  She has not complained of any chest pain or chest tightness or heaviness to her daughter.  And she denies having any dizziness or lightheadedness.  She has had hemorrhoids and every once in a while she does bleed and she gets worried because the whole toilet bowl does look bloody and she does get concerned about it.  She takes her medications regular        Review of patient's allergies indicates:   Allergen Reactions    Alendronate      Upset stomach and GI reflux    Lipase-protease-amylase      Other reaction(s): Unknown    Shellfish containing products      Other reaction(s): Unknown       Past Medical History:   Diagnosis Date    Benign essential HTN     Hearing loss     Hypercholesterolemia     Laryngopharyngeal reflux (LPR)     Rheumatoid arthritis     Wears dentures    Hypertension  Hyperlipidemia  TIA Jan 2016 Transient aphasia  GERD  Gastritis  Gallstones  Schatzki's distal esophageal ring  Giant Cell Arthritis  Rheumatoid Arthritis  Cataracts with implant  right foot surgery  Left Total Knee replacement  C section  Appendectomy  Left breast benign lumpectomy  89 years old    History reviewed. No pertinent surgical history.  Social History     Tobacco Use    Smoking status: Never Smoker    Smokeless tobacco: Never Used   Substance Use Topics    Alcohol use: Yes    Drug use: No        Review of Systems:     ROS  Review of Systems   Constitution: Negative for diaphoresis and fever.   HENT: Negative for nosebleeds.    Cardiovascular: Negative for chest pain, dyspnea on exertion, leg swelling and palpitations.   Respiratory: Negative for shortness of breath and wheezing.    Hematologic/Lymphatic: Negative for bleeding  problem. Does not bruise/bleed easily.   Skin: Negative for color change and rash.   Musculoskeletal: Negative for falls and myalgias.   Gastrointestinal: Negative for hematemesis.  Patient has hemorrhoids and does bleed intermittently with the.  Genitourinary: Negative for hematuria.   Neurological: Negative for dizziness and light-headedness.   Psychiatric/Behavioral: Negative for altered mental status and memory loss.          Objective:        Vitals:    10/05/20 1435   BP: 134/60   Pulse: 74   Resp: 16       No results found for: WBC, HGB, HCT, PLT, CHOL, TRIG, HDL, LDLDIRECT, ALT, AST, NA, K, CL, CREATININE, BUN, CO2, TSH, PSA, INR, GLUF, HGBA1C, MICROALBUR   02/7/19: H/H12.9/38.9, , FBS93, BUN20, CR1.15, , K4.4, C-REACTIVEP 3.9,   10/15/18: H/H 13.8/42.0, wza485, FBS94, BUN21, CR1.12, , K4.7, LDL90, BNP96  5/31/18: H/H 13.9/40.9, FBS 92, Cr 1.0, K 4.9,   4/16/18:   2/27/2018: CBC H/H 12.6/39.1 mch 26.2 CMP BG 88 BUN 21 creat 1.07 G 43 K 5.9   2/12/18: H/H 13.5/40.1, pts 194K, FBS 80, Cr 1.0, K 4.4, LDL 72  10/16/17: H/H 13.9/40.5, pts 198K, FBS 96, Cr 1.19, K 4.7, LDL 87  2/6/17: CBC WNL, Cr 1.2, K 4.8, Gluc 92, Tbili 1.3, LDL 77  10/4/1/16: CBC WNL, Cr 1.23, GFR 37, K 4.2, Tbili 1.4, LDL 69  3/28/16: CBC WNL, Cr 1.46, GFR 30, K 4.3, LDL 65  9/29/15; CBC WNL, CMP WNL x Cr 1.15, GFR 41, T bili 1.5, LDL 92  2/14/15: CBC WNL, CMP WNL, Cr 0.98, GFR 49, K 4.6, LDL 93,   5/27/2014: BMP creat 1.01, eGFR 48  5/13/14: H/H 12.1/37.0, Pts 145K, Cr 1.13, GFR 42, Tbili 1.3, LDL 88  11/1/13: CBC WNL, Cr 0.95, GFR 52, Tbili 1.1,  LDL 90  5/14/13: CBC WNL, Cr 1.1, GFR 44, Tbili 1.3, LDL 97   2/5/13: CBC WNL, CMP WNL, Mg WNL  1/7/13: LDL 88, Tbili 1.2, CBC WNL  9/4/12: WBC 13.5, CMP WNL, LDL 69  8/8/12: CBC WNL x wbc 11.7, CMP WNL x Tbili 1.7  7/12/12: CBC WNL x wbc 10.8 CMP WNL x Tbili 1.2      ECHOCARDIOGRAM RESULTS  No results found for this or any previous visit.  08/28/2014LVEF 69%, Grade  1 diastolic dysfunction, mild RVE, PA/RV pressure 23.2 mmHg, RA pressure 3 mmHg    CURRENT/PREVIOUS VISIT EKG  No results found for this or any previous visit.  No procedure found.   No results found for this or any previous visit.        Physical Exam:    Physical Exam   HEENT: Normocephalic, atraumatic,   PERRL, Conjunctiva pink, no scleral icterus.   NECK:  No JVD no carotid bruit  CVS: S1S2+, RRR, no murmurs, rubs or gallops, JVP: Normal.  LUNGS: Clear  ABDOMEN: Soft, NT, BS+  EXTREMITIES: No cyanosis, clubbing or edema  Gu: No reece catheter, denies dysuria, no hematuria  NEURO: AAO X 3.   PSY :  Normal affect        Medication List with Changes/Refills   Current Medications    ACETAMINOPHEN (TYLENOL) 500 MG TABLET    Take 500 mg by mouth every 6 (six) hours as needed for Pain.    APIXABAN (ELIQUIS) 5 MG (74 TABS) DSPK    Take by mouth. For the first 7 days take two 5 mg tablets twice daily.  After 7 days take one 5 mg tablet twice daily.    AZELASTINE (ASTELIN) 137 MCG (0.1 %) NASAL SPRAY        FLUTICASONE (FLONASE) 50 MCG/ACTUATION NASAL SPRAY    1 spray (50 mcg total) by Each Nare route 2 (two) times daily.    FUROSEMIDE (LASIX) 20 MG TABLET    Take 40 mg by mouth once daily.     GUAIFENESIN-CODEINE 100-10 MG/5 ML (CHERATUSSIN AC)  MG/5 ML SYRUP    Take by mouth.    HYDROXYCHLOROQUINE (PLAQUENIL) 200 MG TABLET    Take 200 mg by mouth 2 (two) times daily.    IPRATROPIUM (ATROVENT) 0.03 % NASAL SPRAY    2 sprays per nare as needed four times daily for sniffles    LOSARTAN (COZAAR) 100 MG TABLET    Take 1 tablet (100 mg total) by mouth once daily.    LOVASTATIN (MEVACOR) 20 MG TABLET    Take 1 tablet (20 mg total) by mouth every evening.    METOPROLOL SUCCINATE (TOPROL-XL) 100 MG 24 HR TABLET    Take 100 mg by mouth once daily.    MONTELUKAST (SINGULAIR) 10 MG TABLET    Take 10 mg by mouth once daily.    OMEPRAZOLE (PRILOSEC) 20 MG CAPSULE    Take 1 capsule (20 mg total) by mouth 2 (two) times daily.                Assessment:       1. Benign essential HTN    #1.  Ambulatory dysfunction  #2.  Deep vein thrombosis  #3.  Unsteady gait/arthritis  #4.  Essential hypertension  #5.  Congestive heart failure  #6.  Acute diastolic failure  #7.  Chronic renal insufficiency  #8.  History of prior TIA and near syncope  #9.  Advanced age        Plan:   1.  Patient has been doing well her blood pressure is well controlled today her blood pressure is 134/60.  And she is currently on losartan 100 mg p.o. q.day metoprolol succinate 100 mg p.o. q.day.  Continue the same.  2.  She takes Lasix furosemide 20 mg p.o. q.day continue the same.  3.  She is on Eliquis 5 mg p.o. b.i.d. patient has hemorrhoids and occasionally she bleeds from it  Discussed with patient that she needs to remind her nurses to check her stools when the bloody so that she can keep a tab on it.  So that with no if she is actually actively bleeding.  4.  Patient has deep vein thrombosis and she had spontaneous thrombosis.  And because of that she has been on Eliquis 5 mg p.o. Q b.i.d.  Knowing the fact that she is likely to redevelop thrombus will keep her at a higher dose of 5 mg p.o. b.i.d. for the present.  And watch her stools and her GI bleeding closely.  5.  Continue current management and I will see her back in the office in 4-6 months time.    Problem List Items Addressed This Visit        Cardiac/Vascular    Benign essential HTN - Primary    Relevant Orders    IN OFFICE EKG 12-LEAD (to Muse)          Follow up in about 4 months (around 2/5/2021).

## 2020-11-22 ENCOUNTER — PATIENT MESSAGE (OUTPATIENT)
Dept: FAMILY MEDICINE | Facility: CLINIC | Age: 85
End: 2020-11-22

## 2020-11-23 ENCOUNTER — TELEPHONE (OUTPATIENT)
Dept: FAMILY MEDICINE | Facility: CLINIC | Age: 85
End: 2020-11-23

## 2020-11-23 RX ORDER — METOPROLOL SUCCINATE 100 MG/1
100 TABLET, EXTENDED RELEASE ORAL DAILY
Qty: 90 TABLET | Refills: 1 | OUTPATIENT
Start: 2020-11-23

## 2020-11-23 RX ORDER — FUROSEMIDE 20 MG/1
40 TABLET ORAL DAILY
Qty: 90 TABLET | Refills: 1 | OUTPATIENT
Start: 2020-11-23

## 2020-11-25 ENCOUNTER — OFFICE VISIT (OUTPATIENT)
Dept: HEMATOLOGY/ONCOLOGY | Facility: CLINIC | Age: 85
End: 2020-11-25
Payer: MEDICARE

## 2020-11-25 VITALS
SYSTOLIC BLOOD PRESSURE: 143 MMHG | WEIGHT: 119 LBS | TEMPERATURE: 98 F | HEART RATE: 57 BPM | BODY MASS INDEX: 25.75 KG/M2 | DIASTOLIC BLOOD PRESSURE: 49 MMHG | RESPIRATION RATE: 18 BRPM

## 2020-11-25 DIAGNOSIS — I82.412 DEEP VENOUS THROMBOSIS OF LEFT PROFUNDA FEMORIS VEIN: ICD-10-CM

## 2020-11-25 PROCEDURE — 99214 PR OFFICE/OUTPT VISIT, EST, LEVL IV, 30-39 MIN: ICD-10-PCS | Mod: S$GLB,,, | Performed by: INTERNAL MEDICINE

## 2020-11-25 PROCEDURE — 99214 OFFICE O/P EST MOD 30 MIN: CPT | Mod: S$GLB,,, | Performed by: INTERNAL MEDICINE

## 2020-11-25 NOTE — PROGRESS NOTES
[facility] hematology Oncology in office follow-up progress note    November 25, 2020    Subjective:      Patient ID:   Mai Suarez  89 y.o. female  12/4/1919  [place]      Chief Complaint:   L leg DVT    HPI:  89 y/o  female with L leg swelling and weeping x's 2 months, found to have DVT.  She is now on   Eliquis 5 mg BID.  As prophylaxis.      U/S no residual DVT seen at the R leg.  She has chronic edema and varicose veins, increased risk of future clot event.  Continue Eliquis 5 mg BID prophylaxis.    Hx HTN, GERD, angina, RA, OA.    T&A at 10, appendectomy at 21, C section x's 3. BTL, L & R cataract, R hand x's 2, L knee x's 3, R foot x's 1.    Smoke 1 ppd x's 50 years, none x's 20 years.  ETOH no.  Job .  Allergy no.    Migrated from [place] to [place], 1926 to 1944.  Father had mustard gas exposure WW1. War convoy [place].    Dad AMI, mustard gas exposure.  Mom CVA.  Sister, sickly.  Daughter pneumonia, auto immune dx.  No family hx of DVT, PE.      ROS:   GEN: normal without any fever, night sweats or weight loss  HEENT: normal with no HA's, sore throat, stiff neck, changes in vision  CV: normal with no CP, SOB, PND, AKHTAR or orthopnea  PULM: normal with no SOB, cough, hemoptysis, sputum or pleuritic pain  GI: normal with no abdominal pain, nausea, vomiting, constipation, diarrhea, melanotic stools, BRBPR, or hematemesis  : normal with no hematuria, dysuria  BREAST: normal with no mass, discharge, pain  SKIN: see HPI     Past Medical History:   Diagnosis Date    Benign essential HTN     Hearing loss     Hypercholesterolemia     Laryngopharyngeal reflux (LPR)     Rheumatoid arthritis     Wears dentures      No past surgical history on file.    Review of patient's allergies indicates:   Allergen Reactions    Alendronate      Upset stomach and GI reflux    Lipase-protease-amylase      Other reaction(s): Unknown    Shellfish containing products       Other reaction(s): Unknown           Current Outpatient Medications:     acetaminophen (TYLENOL) 500 MG tablet, Take 500 mg by mouth every 6 (six) hours as needed for Pain., Disp: , Rfl:     apixaban (ELIQUIS) 5 mg Tab, Take 1 tablet (5 mg total) by mouth every 12 (twelve) hours., Disp: 60 tablet, Rfl: 12    azelastine (ASTELIN) 137 mcg (0.1 %) nasal spray, , Disp: , Rfl:     fluticasone (FLONASE) 50 mcg/actuation nasal spray, 1 spray (50 mcg total) by Each Nare route 2 (two) times daily., Disp: 3 Bottle, Rfl: 4    furosemide (LASIX) 40 MG tablet, Take 1 tablet by mouth once daily, Disp: 90 tablet, Rfl: 0    hydroxychloroquine (PLAQUENIL) 200 mg tablet, Take 200 mg by mouth 2 (two) times daily., Disp: , Rfl:     ipratropium (ATROVENT) 0.03 % nasal spray, 2 sprays per nare as needed four times daily for sniffles, Disp: , Rfl:     losartan (COZAAR) 100 MG tablet, Take 1 tablet (100 mg total) by mouth once daily., Disp: 90 tablet, Rfl: 1    lovastatin (MEVACOR) 20 MG tablet, Take 1 tablet (20 mg total) by mouth every evening., Disp: 90 tablet, Rfl: 1    metoprolol succinate (TOPROL-XL) 100 MG 24 hr tablet, Take 1 tablet by mouth once daily, Disp: 90 tablet, Rfl: 0    omeprazole (PRILOSEC) 20 MG capsule, Take 1 capsule (20 mg total) by mouth 2 (two) times daily., Disp: 180 capsule, Rfl: 1    furosemide (LASIX) 20 MG tablet, Take 40 mg by mouth once daily. , Disp: , Rfl:     guaifenesin-codeine 100-10 mg/5 ml (CHERATUSSIN AC)  mg/5 mL syrup, Take by mouth., Disp: , Rfl:     montelukast (SINGULAIR) 10 mg tablet, Take 10 mg by mouth once daily., Disp: , Rfl:           Objective:   Vitals:  Blood pressure (!) 143/49, pulse (!) 57, temperature 97.9 °F (36.6 °C), resp. rate 18, weight 54 kg (119 lb).    Physical Examination:   GEN: no apparent distress, comfortable  HEAD: atraumatic and normocephalic  EYES: no pallor, no icterus  ENT:  no pharyngeal erythema, external ears WNL; no nasal discharge; no  thrush  NECK: no masses, thyroid normal, trachea midline, no LAD/LN's, supple  CV: RRR with no murmur; normal pulse; normal S1 and S2; no pedal edema  CHEST: Normal respiratory effort; CTAB; normal breath sounds; no wheeze or crackles  ABDOM: nontender and nondistended; soft;  no rebound/guarding  MUSC/Skeletal: ROM normal; no crepitus; joints normal; no deformities   EXTREM: L & R leg with varicose veins and 2+ edema.  SKIN: hematoma changes resolved  : no cvat  NEURO: grossly intact; motor/sensory WNL;  no tremors  PSYCH: normal mood, affect and behavior  LYMPH: normal cervical, supraclavicular, axillary and inguinal LN.  \    Assessment:   (1) 89 y.o. female with diagnosis of R leg femoral vein DVT, 5/26/19.  U/S DVT resolved.    (2)Hgb 12.2, WBC 12,300, plt cnt 159,000.    Continue Eliquis 5 mg BID. Prophylaxis.    (3)R leg hematoma after hitting leg, resolved.    RTC in 6 months., can cancel.  Return to clinic 1 year    Given the extent of her varicose veins and edema of the legs, I would continue on Eliquis chronically.  Prescription sent to her pharmacy for Eliquis 5 mg number 60 sig 1 p.o. q.12 hours with 12 refills.         I have explained and the patient understands all of  the current recommendation(s). I have answered all of their questions to the best of my ability and to their complete satisfaction.

## 2021-01-04 ENCOUNTER — OFFICE VISIT (OUTPATIENT)
Dept: FAMILY MEDICINE | Facility: CLINIC | Age: 86
End: 2021-01-04
Payer: MEDICARE

## 2021-01-04 VITALS
SYSTOLIC BLOOD PRESSURE: 108 MMHG | DIASTOLIC BLOOD PRESSURE: 62 MMHG | WEIGHT: 115 LBS | BODY MASS INDEX: 24.81 KG/M2 | HEART RATE: 62 BPM | HEIGHT: 57 IN

## 2021-01-04 DIAGNOSIS — M19.90 ARTHRITIS: ICD-10-CM

## 2021-01-04 DIAGNOSIS — Z99.89 USES ROLLER WALKER: ICD-10-CM

## 2021-01-04 DIAGNOSIS — Z76.0 MEDICATION REFILL: ICD-10-CM

## 2021-01-04 DIAGNOSIS — K21.9 GASTROESOPHAGEAL REFLUX DISEASE WITHOUT ESOPHAGITIS: ICD-10-CM

## 2021-01-04 DIAGNOSIS — Z13.29 SCREENING FOR ENDOCRINE DISORDER: ICD-10-CM

## 2021-01-04 DIAGNOSIS — Z79.899 LONG-TERM USE OF HIGH-RISK MEDICATION: ICD-10-CM

## 2021-01-04 DIAGNOSIS — I10 ESSENTIAL HYPERTENSION: Primary | ICD-10-CM

## 2021-01-04 DIAGNOSIS — Z13.6 SCREENING FOR ISCHEMIC HEART DISEASE (IHD): ICD-10-CM

## 2021-01-04 DIAGNOSIS — J31.0 CHRONIC RHINITIS: ICD-10-CM

## 2021-01-04 DIAGNOSIS — Z13.89 SCREENING FOR BLOOD OR PROTEIN IN URINE: ICD-10-CM

## 2021-01-04 PROCEDURE — 99214 OFFICE O/P EST MOD 30 MIN: CPT | Mod: S$GLB,,, | Performed by: FAMILY MEDICINE

## 2021-01-04 PROCEDURE — 99214 PR OFFICE/OUTPT VISIT, EST, LEVL IV, 30-39 MIN: ICD-10-PCS | Mod: S$GLB,,, | Performed by: FAMILY MEDICINE

## 2021-01-04 RX ORDER — OMEPRAZOLE 20 MG/1
20 CAPSULE, DELAYED RELEASE ORAL 2 TIMES DAILY
Qty: 180 CAPSULE | Refills: 1 | Status: SHIPPED | OUTPATIENT
Start: 2021-01-04

## 2021-01-04 RX ORDER — FUROSEMIDE 40 MG/1
40 TABLET ORAL DAILY
Qty: 90 TABLET | Refills: 1 | Status: SHIPPED | OUTPATIENT
Start: 2021-01-04 | End: 2021-03-20

## 2021-01-05 ENCOUNTER — TELEPHONE (OUTPATIENT)
Dept: FAMILY MEDICINE | Facility: CLINIC | Age: 86
End: 2021-01-05

## 2021-01-05 LAB
ALBUMIN SERPL-MCNC: 4.2 G/DL (ref 3.6–5.1)
ALBUMIN/CREAT UR: 3 MCG/MG CREAT
ALBUMIN/GLOB SERPL: 1.7 (CALC) (ref 1–2.5)
ALP SERPL-CCNC: 60 U/L (ref 37–153)
ALT SERPL-CCNC: 21 U/L (ref 6–29)
APPEARANCE UR: CLEAR
AST SERPL-CCNC: 22 U/L (ref 10–35)
BASOPHILS # BLD AUTO: 23 CELLS/UL (ref 0–200)
BASOPHILS NFR BLD AUTO: 0.2 %
BILIRUB SERPL-MCNC: 1.4 MG/DL (ref 0.2–1.2)
BILIRUB UR QL STRIP: NEGATIVE
BUN SERPL-MCNC: 35 MG/DL (ref 7–25)
BUN/CREAT SERPL: 28 (CALC) (ref 6–22)
CALCIUM SERPL-MCNC: 10.1 MG/DL (ref 8.6–10.4)
CHLORIDE SERPL-SCNC: 102 MMOL/L (ref 98–110)
CHOLEST SERPL-MCNC: 184 MG/DL
CHOLEST/HDLC SERPL: 2.5 (CALC)
CO2 SERPL-SCNC: 30 MMOL/L (ref 20–32)
COLOR UR: YELLOW
CREAT SERPL-MCNC: 1.23 MG/DL (ref 0.6–0.88)
CREAT UR-MCNC: 145 MG/DL (ref 20–275)
EOSINOPHIL # BLD AUTO: 0 CELLS/UL (ref 15–500)
EOSINOPHIL NFR BLD AUTO: 0 %
ERYTHROCYTE [DISTWIDTH] IN BLOOD BY AUTOMATED COUNT: 15.4 % (ref 11–15)
GFRSERPLBLD MDRD-ARVRAT: 36 ML/MIN/1.73M2
GLOBULIN SER CALC-MCNC: 2.5 G/DL (CALC) (ref 1.9–3.7)
GLUCOSE SERPL-MCNC: 108 MG/DL (ref 65–139)
GLUCOSE UR QL STRIP: NEGATIVE
HCT VFR BLD AUTO: 36.9 % (ref 35–45)
HDLC SERPL-MCNC: 75 MG/DL
HGB BLD-MCNC: 11.7 G/DL (ref 11.7–15.5)
HGB UR QL STRIP: NEGATIVE
KETONES UR QL STRIP: NEGATIVE
LDLC SERPL CALC-MCNC: 88 MG/DL (CALC)
LEUKOCYTE ESTERASE UR QL STRIP: NEGATIVE
LYMPHOCYTES # BLD AUTO: 2930 CELLS/UL (ref 850–3900)
LYMPHOCYTES NFR BLD AUTO: 25.7 %
MCH RBC QN AUTO: 24.2 PG (ref 27–33)
MCHC RBC AUTO-ENTMCNC: 31.7 G/DL (ref 32–36)
MCV RBC AUTO: 76.4 FL (ref 80–100)
MICROALBUMIN UR-MCNC: 0.4 MG/DL
MONOCYTES # BLD AUTO: 866 CELLS/UL (ref 200–950)
MONOCYTES NFR BLD AUTO: 7.6 %
NEUTROPHILS # BLD AUTO: 7581 CELLS/UL (ref 1500–7800)
NEUTROPHILS NFR BLD AUTO: 66.5 %
NITRITE UR QL STRIP: NEGATIVE
NONHDLC SERPL-MCNC: 109 MG/DL (CALC)
PH UR STRIP: ABNORMAL [PH] (ref 5–8)
PLATELET # BLD AUTO: 204 THOUSAND/UL (ref 140–400)
PMV BLD REES-ECKER: 9.3 FL (ref 7.5–12.5)
POTASSIUM SERPL-SCNC: 4.8 MMOL/L (ref 3.5–5.3)
PROT SERPL-MCNC: 6.7 G/DL (ref 6.1–8.1)
PROT UR QL STRIP: ABNORMAL
RBC # BLD AUTO: 4.83 MILLION/UL (ref 3.8–5.1)
SODIUM SERPL-SCNC: 141 MMOL/L (ref 135–146)
SP GR UR STRIP: 1.02 (ref 1–1.03)
TRIGL SERPL-MCNC: 112 MG/DL
TSH SERPL-ACNC: 2.2 MIU/L (ref 0.4–4.5)
WBC # BLD AUTO: 11.4 THOUSAND/UL (ref 3.8–10.8)

## 2021-01-18 ENCOUNTER — TELEPHONE (OUTPATIENT)
Dept: FAMILY MEDICINE | Facility: CLINIC | Age: 86
End: 2021-01-18

## 2021-02-09 ENCOUNTER — OFFICE VISIT (OUTPATIENT)
Dept: CARDIOLOGY | Facility: CLINIC | Age: 86
End: 2021-02-09
Payer: MEDICARE

## 2021-02-09 VITALS
DIASTOLIC BLOOD PRESSURE: 70 MMHG | HEIGHT: 57 IN | OXYGEN SATURATION: 99 % | BODY MASS INDEX: 25.46 KG/M2 | RESPIRATION RATE: 16 BRPM | SYSTOLIC BLOOD PRESSURE: 120 MMHG | HEART RATE: 59 BPM | WEIGHT: 118 LBS

## 2021-02-09 DIAGNOSIS — N18.9 CHRONIC RENAL IMPAIRMENT, UNSPECIFIED CKD STAGE: ICD-10-CM

## 2021-02-09 DIAGNOSIS — R26.2 AMBULATORY DYSFUNCTION: ICD-10-CM

## 2021-02-09 DIAGNOSIS — M19.90 ARTHRITIS: ICD-10-CM

## 2021-02-09 DIAGNOSIS — I50.9 CONGESTIVE HEART FAILURE, UNSPECIFIED HF CHRONICITY, UNSPECIFIED HEART FAILURE TYPE: ICD-10-CM

## 2021-02-09 DIAGNOSIS — I10 BENIGN ESSENTIAL HTN: Primary | ICD-10-CM

## 2021-02-09 DIAGNOSIS — I10 ESSENTIAL HYPERTENSION: ICD-10-CM

## 2021-02-09 DIAGNOSIS — I51.89 DIASTOLIC DYSFUNCTION: ICD-10-CM

## 2021-02-09 DIAGNOSIS — Z86.73 HISTORY OF TIA (TRANSIENT ISCHEMIC ATTACK): ICD-10-CM

## 2021-02-09 DIAGNOSIS — R54 ADVANCED AGE: ICD-10-CM

## 2021-02-09 PROCEDURE — 99213 PR OFFICE/OUTPT VISIT, EST, LEVL III, 20-29 MIN: ICD-10-PCS | Mod: S$GLB,,, | Performed by: INTERNAL MEDICINE

## 2021-02-09 PROCEDURE — 99213 OFFICE O/P EST LOW 20 MIN: CPT | Mod: S$GLB,,, | Performed by: INTERNAL MEDICINE

## 2021-02-09 RX ORDER — LOSARTAN POTASSIUM 100 MG/1
100 TABLET ORAL DAILY
Qty: 90 TABLET | Refills: 1 | Status: SHIPPED | OUTPATIENT
Start: 2021-02-09 | End: 2021-08-08

## 2021-03-19 ENCOUNTER — HOSPITAL ENCOUNTER (OUTPATIENT)
Facility: HOSPITAL | Age: 86
Discharge: HOME | End: 2021-03-20
Attending: EMERGENCY MEDICINE | Admitting: INTERNAL MEDICINE
Payer: MEDICARE

## 2021-03-19 DIAGNOSIS — R11.2 NON-INTRACTABLE VOMITING WITH NAUSEA, UNSPECIFIED VOMITING TYPE: Primary | ICD-10-CM

## 2021-03-19 DIAGNOSIS — R11.10 VOMITING: ICD-10-CM

## 2021-03-19 DIAGNOSIS — J34.89 RHINORRHEA: ICD-10-CM

## 2021-03-19 DIAGNOSIS — R19.7 DIARRHEA, UNSPECIFIED TYPE: ICD-10-CM

## 2021-03-19 DIAGNOSIS — K52.9 GASTROENTERITIS: ICD-10-CM

## 2021-03-19 LAB
ALBUMIN SERPL BCP-MCNC: 4.3 G/DL (ref 3.5–5.2)
ALP SERPL-CCNC: 65 U/L (ref 55–135)
ALT SERPL W/O P-5'-P-CCNC: 24 U/L (ref 10–44)
ANION GAP SERPL CALC-SCNC: 12 MMOL/L (ref 8–16)
AST SERPL-CCNC: 29 U/L (ref 10–40)
BASOPHILS # BLD AUTO: 0.03 K/UL (ref 0–0.2)
BASOPHILS NFR BLD: 0.2 % (ref 0–1.9)
BILIRUB SERPL-MCNC: 1.5 MG/DL (ref 0.1–1)
BUN SERPL-MCNC: 27 MG/DL (ref 10–30)
CALCIUM SERPL-MCNC: 9.2 MG/DL (ref 8.7–10.5)
CHLORIDE SERPL-SCNC: 105 MMOL/L (ref 95–110)
CO2 SERPL-SCNC: 25 MMOL/L (ref 23–29)
CREAT SERPL-MCNC: 1.2 MG/DL (ref 0.5–1.4)
DIFFERENTIAL METHOD: ABNORMAL
EOSINOPHIL # BLD AUTO: 0 K/UL (ref 0–0.5)
EOSINOPHIL NFR BLD: 0 % (ref 0–8)
ERYTHROCYTE [DISTWIDTH] IN BLOOD BY AUTOMATED COUNT: 15.1 % (ref 11.5–14.5)
EST. GFR  (AFRICAN AMERICAN): 42.6 ML/MIN/1.73 M^2
EST. GFR  (NON AFRICAN AMERICAN): 36.9 ML/MIN/1.73 M^2
GLUCOSE SERPL-MCNC: 138 MG/DL (ref 70–110)
HCT VFR BLD AUTO: 39.8 % (ref 37–48.5)
HGB BLD-MCNC: 11.6 G/DL (ref 12–16)
IMM GRANULOCYTES # BLD AUTO: 0.08 K/UL (ref 0–0.04)
IMM GRANULOCYTES NFR BLD AUTO: 0.5 % (ref 0–0.5)
LIPASE SERPL-CCNC: 28 U/L (ref 4–60)
LYMPHOCYTES # BLD AUTO: 1.3 K/UL (ref 1–4.8)
LYMPHOCYTES NFR BLD: 8.6 % (ref 18–48)
MCH RBC QN AUTO: 23.7 PG (ref 27–31)
MCHC RBC AUTO-ENTMCNC: 29.1 G/DL (ref 32–36)
MCV RBC AUTO: 81 FL (ref 82–98)
MONOCYTES # BLD AUTO: 1 K/UL (ref 0.3–1)
MONOCYTES NFR BLD: 7 % (ref 4–15)
NEUTROPHILS # BLD AUTO: 12.4 K/UL (ref 1.8–7.7)
NEUTROPHILS NFR BLD: 83.7 % (ref 38–73)
NRBC BLD-RTO: 0 /100 WBC
PLATELET # BLD AUTO: 225 K/UL (ref 150–350)
PMV BLD AUTO: 9.2 FL (ref 9.2–12.9)
POTASSIUM SERPL-SCNC: 3.8 MMOL/L (ref 3.5–5.1)
PROT SERPL-MCNC: 7.5 G/DL (ref 6–8.4)
RBC # BLD AUTO: 4.89 M/UL (ref 4–5.4)
SODIUM SERPL-SCNC: 142 MMOL/L (ref 136–145)
WBC # BLD AUTO: 14.79 K/UL (ref 3.9–12.7)

## 2021-03-19 PROCEDURE — 93005 ELECTROCARDIOGRAM TRACING: CPT | Performed by: SPECIALIST

## 2021-03-19 PROCEDURE — 83690 ASSAY OF LIPASE: CPT | Performed by: STUDENT IN AN ORGANIZED HEALTH CARE EDUCATION/TRAINING PROGRAM

## 2021-03-19 PROCEDURE — 63600175 PHARM REV CODE 636 W HCPCS: Performed by: STUDENT IN AN ORGANIZED HEALTH CARE EDUCATION/TRAINING PROGRAM

## 2021-03-19 PROCEDURE — 85025 COMPLETE CBC W/AUTO DIFF WBC: CPT | Performed by: STUDENT IN AN ORGANIZED HEALTH CARE EDUCATION/TRAINING PROGRAM

## 2021-03-19 PROCEDURE — 96375 TX/PRO/DX INJ NEW DRUG ADDON: CPT | Mod: 59

## 2021-03-19 PROCEDURE — 93010 EKG 12-LEAD: ICD-10-PCS | Mod: ,,, | Performed by: SPECIALIST

## 2021-03-19 PROCEDURE — 99285 EMERGENCY DEPT VISIT HI MDM: CPT | Mod: 25,CS

## 2021-03-19 PROCEDURE — 96374 THER/PROPH/DIAG INJ IV PUSH: CPT

## 2021-03-19 PROCEDURE — 93010 ELECTROCARDIOGRAM REPORT: CPT | Mod: ,,, | Performed by: SPECIALIST

## 2021-03-19 PROCEDURE — 80053 COMPREHEN METABOLIC PANEL: CPT | Performed by: STUDENT IN AN ORGANIZED HEALTH CARE EDUCATION/TRAINING PROGRAM

## 2021-03-19 RX ORDER — ONDANSETRON 2 MG/ML
4 INJECTION INTRAMUSCULAR; INTRAVENOUS
Status: COMPLETED | OUTPATIENT
Start: 2021-03-19 | End: 2021-03-19

## 2021-03-19 RX ORDER — FAMOTIDINE 10 MG/ML
20 INJECTION INTRAVENOUS
Status: COMPLETED | OUTPATIENT
Start: 2021-03-19 | End: 2021-03-20

## 2021-03-19 RX ORDER — DIPHENHYDRAMINE HYDROCHLORIDE 50 MG/ML
12.5 INJECTION INTRAMUSCULAR; INTRAVENOUS
Status: COMPLETED | OUTPATIENT
Start: 2021-03-19 | End: 2021-03-19

## 2021-03-19 RX ORDER — METHYLPREDNISOLONE SOD SUCC 125 MG
125 VIAL (EA) INJECTION
Status: COMPLETED | OUTPATIENT
Start: 2021-03-19 | End: 2021-03-20

## 2021-03-19 RX ADMIN — DIPHENHYDRAMINE HYDROCHLORIDE 12.5 MG: 50 INJECTION, SOLUTION INTRAMUSCULAR; INTRAVENOUS at 11:03

## 2021-03-19 RX ADMIN — ONDANSETRON 4 MG: 2 INJECTION INTRAMUSCULAR; INTRAVENOUS at 11:03

## 2021-03-20 VITALS
HEIGHT: 58 IN | SYSTOLIC BLOOD PRESSURE: 136 MMHG | HEART RATE: 64 BPM | DIASTOLIC BLOOD PRESSURE: 68 MMHG | RESPIRATION RATE: 18 BRPM | BODY MASS INDEX: 24.85 KG/M2 | WEIGHT: 118.38 LBS | OXYGEN SATURATION: 98 % | TEMPERATURE: 98 F

## 2021-03-20 PROBLEM — K52.9 GASTROENTERITIS: Status: RESOLVED | Noted: 2021-03-20 | Resolved: 2021-03-20

## 2021-03-20 PROBLEM — K52.9 GASTROENTERITIS: Status: ACTIVE | Noted: 2021-03-20

## 2021-03-20 LAB
ANION GAP SERPL CALC-SCNC: 8 MMOL/L (ref 8–16)
BACTERIA #/AREA URNS HPF: NEGATIVE /HPF
BASOPHILS # BLD AUTO: 0.02 K/UL (ref 0–0.2)
BASOPHILS NFR BLD: 0.2 % (ref 0–1.9)
BILIRUB UR QL STRIP: NEGATIVE
BUN SERPL-MCNC: 27 MG/DL (ref 10–30)
C DIFF GDH STL QL: NEGATIVE
C DIFF TOX A+B STL QL IA: NEGATIVE
CALCIUM SERPL-MCNC: 8.3 MG/DL (ref 8.7–10.5)
CHLORIDE SERPL-SCNC: 106 MMOL/L (ref 95–110)
CLARITY UR: CLEAR
CO2 SERPL-SCNC: 25 MMOL/L (ref 23–29)
COLOR UR: YELLOW
CREAT SERPL-MCNC: 1.1 MG/DL (ref 0.5–1.4)
DIFFERENTIAL METHOD: ABNORMAL
EOSINOPHIL # BLD AUTO: 0 K/UL (ref 0–0.5)
EOSINOPHIL NFR BLD: 0 % (ref 0–8)
ERYTHROCYTE [DISTWIDTH] IN BLOOD BY AUTOMATED COUNT: 15.1 % (ref 11.5–14.5)
EST. GFR  (AFRICAN AMERICAN): 47.3 ML/MIN/1.73 M^2
EST. GFR  (NON AFRICAN AMERICAN): 41 ML/MIN/1.73 M^2
GLUCOSE SERPL-MCNC: 153 MG/DL (ref 70–110)
GLUCOSE UR QL STRIP: NEGATIVE
HCT VFR BLD AUTO: 29.7 % (ref 37–48.5)
HGB BLD-MCNC: 9 G/DL (ref 12–16)
HGB UR QL STRIP: NEGATIVE
HYALINE CASTS #/AREA URNS LPF: 20 /LPF
IMM GRANULOCYTES # BLD AUTO: 0.05 K/UL (ref 0–0.04)
IMM GRANULOCYTES NFR BLD AUTO: 0.5 % (ref 0–0.5)
KETONES UR QL STRIP: NEGATIVE
LEUKOCYTE ESTERASE UR QL STRIP: NEGATIVE
LYMPHOCYTES # BLD AUTO: 0.6 K/UL (ref 1–4.8)
LYMPHOCYTES NFR BLD: 6.3 % (ref 18–48)
MCH RBC QN AUTO: 23.6 PG (ref 27–31)
MCHC RBC AUTO-ENTMCNC: 30.3 G/DL (ref 32–36)
MCV RBC AUTO: 78 FL (ref 82–98)
MICROSCOPIC COMMENT: ABNORMAL
MONOCYTES # BLD AUTO: 0.1 K/UL (ref 0.3–1)
MONOCYTES NFR BLD: 1.2 % (ref 4–15)
NEUTROPHILS # BLD AUTO: 9 K/UL (ref 1.8–7.7)
NEUTROPHILS NFR BLD: 91.8 % (ref 38–73)
NITRITE UR QL STRIP: NEGATIVE
NRBC BLD-RTO: 0 /100 WBC
OB PNL STL: POSITIVE
PH UR STRIP: 6 [PH] (ref 5–8)
PLATELET # BLD AUTO: 165 K/UL (ref 150–350)
PMV BLD AUTO: 9.2 FL (ref 9.2–12.9)
POTASSIUM SERPL-SCNC: 4.3 MMOL/L (ref 3.5–5.1)
PROT UR QL STRIP: ABNORMAL
RBC # BLD AUTO: 3.82 M/UL (ref 4–5.4)
RBC #/AREA URNS HPF: 2 /HPF (ref 0–4)
SARS-COV-2 RDRP RESP QL NAA+PROBE: NEGATIVE
SODIUM SERPL-SCNC: 139 MMOL/L (ref 136–145)
SP GR UR STRIP: >1.03 (ref 1–1.03)
SQUAMOUS #/AREA URNS HPF: 2 /HPF
URN SPEC COLLECT METH UR: ABNORMAL
UROBILINOGEN UR STRIP-ACNC: NEGATIVE EU/DL
WBC # BLD AUTO: 9.78 K/UL (ref 3.9–12.7)
WBC #/AREA STL HPF: NORMAL /[HPF]
WBC #/AREA URNS HPF: 1 /HPF (ref 0–5)

## 2021-03-20 PROCEDURE — 36415 COLL VENOUS BLD VENIPUNCTURE: CPT | Performed by: INTERNAL MEDICINE

## 2021-03-20 PROCEDURE — 25000003 PHARM REV CODE 250: Performed by: INTERNAL MEDICINE

## 2021-03-20 PROCEDURE — 63600175 PHARM REV CODE 636 W HCPCS: Performed by: STUDENT IN AN ORGANIZED HEALTH CARE EDUCATION/TRAINING PROGRAM

## 2021-03-20 PROCEDURE — 87324 CLOSTRIDIUM AG IA: CPT | Performed by: EMERGENCY MEDICINE

## 2021-03-20 PROCEDURE — G0378 HOSPITAL OBSERVATION PER HR: HCPCS | Mod: CS

## 2021-03-20 PROCEDURE — U0002 COVID-19 LAB TEST NON-CDC: HCPCS | Performed by: STUDENT IN AN ORGANIZED HEALTH CARE EDUCATION/TRAINING PROGRAM

## 2021-03-20 PROCEDURE — G0378 HOSPITAL OBSERVATION PER HR: HCPCS

## 2021-03-20 PROCEDURE — 89055 LEUKOCYTE ASSESSMENT FECAL: CPT | Performed by: EMERGENCY MEDICINE

## 2021-03-20 PROCEDURE — 87015 SPECIMEN INFECT AGNT CONCNTJ: CPT | Mod: 59 | Performed by: EMERGENCY MEDICINE

## 2021-03-20 PROCEDURE — 87046 STOOL CULTR AEROBIC BACT EA: CPT | Performed by: EMERGENCY MEDICINE

## 2021-03-20 PROCEDURE — 87045 FECES CULTURE AEROBIC BACT: CPT | Performed by: EMERGENCY MEDICINE

## 2021-03-20 PROCEDURE — 25500020 PHARM REV CODE 255: Performed by: EMERGENCY MEDICINE

## 2021-03-20 PROCEDURE — 96375 TX/PRO/DX INJ NEW DRUG ADDON: CPT

## 2021-03-20 PROCEDURE — 87209 SMEAR COMPLEX STAIN: CPT | Performed by: EMERGENCY MEDICINE

## 2021-03-20 PROCEDURE — 96361 HYDRATE IV INFUSION ADD-ON: CPT

## 2021-03-20 PROCEDURE — 87449 NOS EACH ORGANISM AG IA: CPT | Performed by: EMERGENCY MEDICINE

## 2021-03-20 PROCEDURE — 25000003 PHARM REV CODE 250: Performed by: STUDENT IN AN ORGANIZED HEALTH CARE EDUCATION/TRAINING PROGRAM

## 2021-03-20 PROCEDURE — 85025 COMPLETE CBC W/AUTO DIFF WBC: CPT | Performed by: INTERNAL MEDICINE

## 2021-03-20 PROCEDURE — 81001 URINALYSIS AUTO W/SCOPE: CPT | Performed by: STUDENT IN AN ORGANIZED HEALTH CARE EDUCATION/TRAINING PROGRAM

## 2021-03-20 PROCEDURE — 63600175 PHARM REV CODE 636 W HCPCS: Performed by: INTERNAL MEDICINE

## 2021-03-20 PROCEDURE — 82272 OCCULT BLD FECES 1-3 TESTS: CPT | Performed by: EMERGENCY MEDICINE

## 2021-03-20 PROCEDURE — 80048 BASIC METABOLIC PNL TOTAL CA: CPT | Performed by: INTERNAL MEDICINE

## 2021-03-20 RX ORDER — BIOTIN 10 MG
1 TABLET ORAL
Qty: 90 CAPSULE | Refills: 0 | Status: SHIPPED | OUTPATIENT
Start: 2021-03-20

## 2021-03-20 RX ORDER — FUROSEMIDE 40 MG/1
40 TABLET ORAL DAILY
Status: DISCONTINUED | OUTPATIENT
Start: 2021-03-20 | End: 2021-03-20 | Stop reason: HOSPADM

## 2021-03-20 RX ORDER — TALC
6 POWDER (GRAM) TOPICAL NIGHTLY PRN
Status: DISCONTINUED | OUTPATIENT
Start: 2021-03-20 | End: 2021-03-20 | Stop reason: HOSPADM

## 2021-03-20 RX ORDER — LOSARTAN POTASSIUM 50 MG/1
100 TABLET ORAL DAILY
Status: DISCONTINUED | OUTPATIENT
Start: 2021-03-20 | End: 2021-03-20 | Stop reason: HOSPADM

## 2021-03-20 RX ORDER — METOPROLOL SUCCINATE 50 MG/1
100 TABLET, EXTENDED RELEASE ORAL DAILY
Status: DISCONTINUED | OUTPATIENT
Start: 2021-03-20 | End: 2021-03-20 | Stop reason: HOSPADM

## 2021-03-20 RX ORDER — SODIUM CHLORIDE, SODIUM LACTATE, POTASSIUM CHLORIDE, CALCIUM CHLORIDE 600; 310; 30; 20 MG/100ML; MG/100ML; MG/100ML; MG/100ML
INJECTION, SOLUTION INTRAVENOUS CONTINUOUS
Status: DISCONTINUED | OUTPATIENT
Start: 2021-03-20 | End: 2021-03-20 | Stop reason: HOSPADM

## 2021-03-20 RX ORDER — PANTOPRAZOLE SODIUM 40 MG/1
40 TABLET, DELAYED RELEASE ORAL DAILY
Status: DISCONTINUED | OUTPATIENT
Start: 2021-03-20 | End: 2021-03-20 | Stop reason: HOSPADM

## 2021-03-20 RX ORDER — HYDROXYCHLOROQUINE SULFATE 200 MG/1
200 TABLET, FILM COATED ORAL 2 TIMES DAILY
Status: DISCONTINUED | OUTPATIENT
Start: 2021-03-20 | End: 2021-03-20 | Stop reason: HOSPADM

## 2021-03-20 RX ORDER — ATORVASTATIN CALCIUM 10 MG/1
10 TABLET, FILM COATED ORAL NIGHTLY
Status: DISCONTINUED | OUTPATIENT
Start: 2021-03-20 | End: 2021-03-20 | Stop reason: HOSPADM

## 2021-03-20 RX ADMIN — SODIUM CHLORIDE, SODIUM LACTATE, POTASSIUM CHLORIDE, AND CALCIUM CHLORIDE: .6; .31; .03; .02 INJECTION, SOLUTION INTRAVENOUS at 02:03

## 2021-03-20 RX ADMIN — FUROSEMIDE 40 MG: 40 TABLET ORAL at 09:03

## 2021-03-20 RX ADMIN — LOSARTAN POTASSIUM 100 MG: 50 TABLET, FILM COATED ORAL at 09:03

## 2021-03-20 RX ADMIN — HYDROXYCHLOROQUINE SULFATE 200 MG: 200 TABLET, FILM COATED ORAL at 09:03

## 2021-03-20 RX ADMIN — APIXABAN 5 MG: 5 TABLET, FILM COATED ORAL at 09:03

## 2021-03-20 RX ADMIN — FAMOTIDINE 20 MG: 10 INJECTION, SOLUTION INTRAVENOUS at 12:03

## 2021-03-20 RX ADMIN — PANTOPRAZOLE SODIUM 40 MG: 40 TABLET, DELAYED RELEASE ORAL at 09:03

## 2021-03-20 RX ADMIN — METOPROLOL SUCCINATE 100 MG: 50 TABLET, EXTENDED RELEASE ORAL at 09:03

## 2021-03-20 RX ADMIN — METHYLPREDNISOLONE SODIUM SUCCINATE 125 MG: 125 INJECTION, POWDER, FOR SOLUTION INTRAMUSCULAR; INTRAVENOUS at 12:03

## 2021-03-20 RX ADMIN — ATORVASTATIN CALCIUM 10 MG: 10 TABLET, FILM COATED ORAL at 03:03

## 2021-03-20 RX ADMIN — IOHEXOL 70 ML: 350 INJECTION, SOLUTION INTRAVENOUS at 12:03

## 2021-03-20 RX ADMIN — SODIUM CHLORIDE, SODIUM LACTATE, POTASSIUM CHLORIDE, AND CALCIUM CHLORIDE 500 ML: .6; .31; .03; .02 INJECTION, SOLUTION INTRAVENOUS at 12:03

## 2021-03-22 ENCOUNTER — TELEPHONE (OUTPATIENT)
Dept: FAMILY MEDICINE | Facility: CLINIC | Age: 86
End: 2021-03-22

## 2021-03-22 DIAGNOSIS — I50.9 CONGESTIVE HEART FAILURE, UNSPECIFIED HF CHRONICITY, UNSPECIFIED HEART FAILURE TYPE: Primary | ICD-10-CM

## 2021-03-22 DIAGNOSIS — K21.9 LARYNGOPHARYNGEAL REFLUX (LPR): ICD-10-CM

## 2021-03-22 DIAGNOSIS — M19.90 ARTHRITIS: ICD-10-CM

## 2021-03-22 DIAGNOSIS — R54 ADVANCED AGE: ICD-10-CM

## 2021-03-22 DIAGNOSIS — I51.89 DIASTOLIC DYSFUNCTION: ICD-10-CM

## 2021-03-22 LAB
STOOL CULTURE: NORMAL
STOOL CULTURE: NORMAL

## 2021-03-25 ENCOUNTER — TELEPHONE (OUTPATIENT)
Dept: HEMATOLOGY/ONCOLOGY | Facility: CLINIC | Age: 86
End: 2021-03-25

## 2021-04-02 LAB — O+P STL TRI STN: NORMAL

## 2021-04-05 ENCOUNTER — PATIENT MESSAGE (OUTPATIENT)
Dept: HEMATOLOGY/ONCOLOGY | Facility: CLINIC | Age: 86
End: 2021-04-05

## 2021-05-03 ENCOUNTER — PATIENT MESSAGE (OUTPATIENT)
Dept: FAMILY MEDICINE | Facility: CLINIC | Age: 86
End: 2021-05-03

## 2021-05-20 ENCOUNTER — TELEPHONE (OUTPATIENT)
Dept: HEMATOLOGY/ONCOLOGY | Facility: CLINIC | Age: 86
End: 2021-05-20

## 2021-06-19 ENCOUNTER — PATIENT MESSAGE (OUTPATIENT)
Dept: CARDIOLOGY | Facility: CLINIC | Age: 86
End: 2021-06-19

## 2021-09-09 ENCOUNTER — PATIENT MESSAGE (OUTPATIENT)
Dept: ADMINISTRATIVE | Facility: OTHER | Age: 86
End: 2021-09-09

## 2021-09-09 ENCOUNTER — TELEPHONE (OUTPATIENT)
Dept: URGENT CARE | Facility: CLINIC | Age: 86
End: 2021-09-09

## 2021-09-09 ENCOUNTER — NURSE TRIAGE (OUTPATIENT)
Dept: ADMINISTRATIVE | Facility: CLINIC | Age: 86
End: 2021-09-09

## 2021-09-09 ENCOUNTER — LAB VISIT (OUTPATIENT)
Dept: URGENT CARE | Facility: CLINIC | Age: 86
End: 2021-09-09
Payer: MEDICARE

## 2021-09-09 DIAGNOSIS — Z20.822 CLOSE EXPOSURE TO COVID-19 VIRUS: Primary | ICD-10-CM

## 2021-09-09 DIAGNOSIS — U07.1 COVID-19 VIRUS DETECTED: Primary | ICD-10-CM

## 2021-09-09 LAB
CTP QC/QA: YES
SARS-COV-2 RDRP RESP QL NAA+PROBE: POSITIVE

## 2021-09-09 PROCEDURE — U0002 COVID-19 LAB TEST NON-CDC: HCPCS | Mod: GW,QW,CR,S$GLB | Performed by: EMERGENCY MEDICINE

## 2021-09-09 PROCEDURE — U0003 INFECTIOUS AGENT DETECTION BY NUCLEIC ACID (DNA OR RNA); SEVERE ACUTE RESPIRATORY SYNDROME CORONAVIRUS 2 (SARS-COV-2) (CORONAVIRUS DISEASE [COVID-19]), AMPLIFIED PROBE TECHNIQUE, MAKING USE OF HIGH THROUGHPUT TECHNOLOGIES AS DESCRIBED BY CMS-2020-01-R: HCPCS | Performed by: EMERGENCY MEDICINE

## 2021-09-09 PROCEDURE — U0005 INFEC AGEN DETEC AMPLI PROBE: HCPCS | Performed by: EMERGENCY MEDICINE

## 2021-09-09 PROCEDURE — U0002: ICD-10-PCS | Mod: GW,QW,CR,S$GLB | Performed by: EMERGENCY MEDICINE

## 2021-09-10 ENCOUNTER — INFUSION (OUTPATIENT)
Dept: INFECTIOUS DISEASES | Facility: HOSPITAL | Age: 86
End: 2021-09-10
Attending: NURSE PRACTITIONER
Payer: MEDICARE

## 2021-09-10 ENCOUNTER — PATIENT MESSAGE (OUTPATIENT)
Dept: ADMINISTRATIVE | Facility: OTHER | Age: 86
End: 2021-09-10

## 2021-09-10 ENCOUNTER — NURSE TRIAGE (OUTPATIENT)
Dept: ADMINISTRATIVE | Facility: CLINIC | Age: 86
End: 2021-09-10

## 2021-09-10 ENCOUNTER — PATIENT OUTREACH (OUTPATIENT)
Dept: INFECTIOUS DISEASES | Facility: HOSPITAL | Age: 86
End: 2021-09-10

## 2021-09-10 VITALS
HEART RATE: 60 BPM | RESPIRATION RATE: 18 BRPM | DIASTOLIC BLOOD PRESSURE: 72 MMHG | SYSTOLIC BLOOD PRESSURE: 169 MMHG | TEMPERATURE: 98 F | OXYGEN SATURATION: 97 %

## 2021-09-10 DIAGNOSIS — U07.1 COVID-19: Primary | ICD-10-CM

## 2021-09-10 LAB
SARS-COV-2 RNA RESP QL NAA+PROBE: DETECTED
SARS-COV-2- CYCLE NUMBER: 21

## 2021-09-10 PROCEDURE — 63600175 PHARM REV CODE 636 W HCPCS: Performed by: NURSE PRACTITIONER

## 2021-09-10 PROCEDURE — 25000003 PHARM REV CODE 250: Performed by: NURSE PRACTITIONER

## 2021-09-10 PROCEDURE — M0243 CASIRIVI AND IMDEVI INFUSION: HCPCS | Performed by: NURSE PRACTITIONER

## 2021-09-10 RX ORDER — EPINEPHRINE 0.3 MG/.3ML
0.3 INJECTION SUBCUTANEOUS
Status: ACTIVE | OUTPATIENT
Start: 2021-09-10

## 2021-09-10 RX ORDER — ONDANSETRON 4 MG/1
4 TABLET, ORALLY DISINTEGRATING ORAL ONCE AS NEEDED
Status: DISPENSED | OUTPATIENT
Start: 2021-09-10 | End: 2033-02-06

## 2021-09-10 RX ORDER — ALBUTEROL SULFATE 90 UG/1
2 AEROSOL, METERED RESPIRATORY (INHALATION)
Status: ACTIVE | OUTPATIENT
Start: 2021-09-10

## 2021-09-10 RX ORDER — ACETAMINOPHEN 325 MG/1
650 TABLET ORAL ONCE AS NEEDED
Status: ACTIVE | OUTPATIENT
Start: 2021-09-10 | End: 2033-02-06

## 2021-09-10 RX ORDER — DIPHENHYDRAMINE HYDROCHLORIDE 50 MG/ML
25 INJECTION INTRAMUSCULAR; INTRAVENOUS ONCE AS NEEDED
Status: ACTIVE | OUTPATIENT
Start: 2021-09-10 | End: 2033-02-06

## 2021-09-10 RX ORDER — SODIUM CHLORIDE 0.9 % (FLUSH) 0.9 %
10 SYRINGE (ML) INJECTION
Status: ACTIVE | OUTPATIENT
Start: 2021-09-10

## 2021-09-10 RX ADMIN — CASIRIVIMAB AND IMDEVIMAB 600 MG: 600; 600 INJECTION, SOLUTION, CONCENTRATE INTRAVENOUS at 11:09

## 2021-09-11 ENCOUNTER — PATIENT MESSAGE (OUTPATIENT)
Dept: ADMINISTRATIVE | Facility: OTHER | Age: 86
End: 2021-09-11

## 2021-09-12 ENCOUNTER — PATIENT MESSAGE (OUTPATIENT)
Dept: ADMINISTRATIVE | Facility: OTHER | Age: 86
End: 2021-09-12

## 2021-09-13 ENCOUNTER — PATIENT MESSAGE (OUTPATIENT)
Dept: ADMINISTRATIVE | Facility: OTHER | Age: 86
End: 2021-09-13

## 2021-09-13 ENCOUNTER — PATIENT MESSAGE (OUTPATIENT)
Dept: ADMINISTRATIVE | Facility: CLINIC | Age: 86
End: 2021-09-13

## 2021-09-14 ENCOUNTER — PATIENT MESSAGE (OUTPATIENT)
Dept: ADMINISTRATIVE | Facility: OTHER | Age: 86
End: 2021-09-14

## 2021-09-15 ENCOUNTER — PATIENT MESSAGE (OUTPATIENT)
Dept: ADMINISTRATIVE | Facility: OTHER | Age: 86
End: 2021-09-15

## 2021-09-16 ENCOUNTER — PATIENT MESSAGE (OUTPATIENT)
Dept: ADMINISTRATIVE | Facility: OTHER | Age: 86
End: 2021-09-16

## 2021-09-17 ENCOUNTER — PATIENT MESSAGE (OUTPATIENT)
Dept: ADMINISTRATIVE | Facility: OTHER | Age: 86
End: 2021-09-17

## 2021-09-18 ENCOUNTER — PATIENT MESSAGE (OUTPATIENT)
Dept: ADMINISTRATIVE | Facility: OTHER | Age: 86
End: 2021-09-18

## 2021-09-19 ENCOUNTER — PATIENT MESSAGE (OUTPATIENT)
Dept: ADMINISTRATIVE | Facility: CLINIC | Age: 86
End: 2021-09-19

## 2021-09-19 ENCOUNTER — PATIENT MESSAGE (OUTPATIENT)
Dept: ADMINISTRATIVE | Facility: OTHER | Age: 86
End: 2021-09-19

## 2021-09-20 ENCOUNTER — PATIENT MESSAGE (OUTPATIENT)
Dept: ADMINISTRATIVE | Facility: OTHER | Age: 86
End: 2021-09-20

## 2021-09-20 ENCOUNTER — PATIENT MESSAGE (OUTPATIENT)
Dept: ADMINISTRATIVE | Facility: CLINIC | Age: 86
End: 2021-09-20

## 2021-09-21 ENCOUNTER — PATIENT MESSAGE (OUTPATIENT)
Dept: ADMINISTRATIVE | Facility: CLINIC | Age: 86
End: 2021-09-21

## 2021-09-21 ENCOUNTER — PATIENT MESSAGE (OUTPATIENT)
Dept: ADMINISTRATIVE | Facility: OTHER | Age: 86
End: 2021-09-21

## 2021-09-22 ENCOUNTER — PATIENT MESSAGE (OUTPATIENT)
Dept: ADMINISTRATIVE | Facility: CLINIC | Age: 86
End: 2021-09-22

## 2021-09-22 ENCOUNTER — PATIENT MESSAGE (OUTPATIENT)
Dept: ADMINISTRATIVE | Facility: OTHER | Age: 86
End: 2021-09-22

## 2021-09-23 ENCOUNTER — PATIENT MESSAGE (OUTPATIENT)
Dept: ADMINISTRATIVE | Facility: OTHER | Age: 86
End: 2021-09-23

## 2021-11-29 ENCOUNTER — IMMUNIZATION (OUTPATIENT)
Dept: PRIMARY CARE CLINIC | Facility: CLINIC | Age: 86
End: 2021-11-29
Payer: MEDICARE

## 2021-11-29 DIAGNOSIS — Z23 NEED FOR VACCINATION: Primary | ICD-10-CM

## 2021-11-29 PROCEDURE — 91306 COVID-19, MRNA, LNP-S, PF, 100 MCG/0.25 ML DOSE VACCINE (MODERNA BOOSTER): ICD-10-PCS | Mod: S$GLB,,, | Performed by: FAMILY MEDICINE

## 2021-11-29 PROCEDURE — 0064A COVID-19, MRNA, LNP-S, PF, 100 MCG/0.25 ML DOSE VACCINE (MODERNA BOOSTER): CPT | Mod: S$GLB,,, | Performed by: FAMILY MEDICINE

## 2021-11-29 PROCEDURE — 0064A COVID-19, MRNA, LNP-S, PF, 100 MCG/0.25 ML DOSE VACCINE (MODERNA BOOSTER): ICD-10-PCS | Mod: S$GLB,,, | Performed by: FAMILY MEDICINE

## 2021-11-29 PROCEDURE — 91306 COVID-19, MRNA, LNP-S, PF, 100 MCG/0.25 ML DOSE VACCINE (MODERNA BOOSTER): CPT | Mod: S$GLB,,, | Performed by: FAMILY MEDICINE

## 2021-12-13 ENCOUNTER — TELEPHONE (OUTPATIENT)
Dept: HEMATOLOGY/ONCOLOGY | Facility: CLINIC | Age: 86
End: 2021-12-13

## 2021-12-13 ENCOUNTER — OFFICE VISIT (OUTPATIENT)
Dept: HEMATOLOGY/ONCOLOGY | Facility: CLINIC | Age: 86
End: 2021-12-13
Payer: MEDICARE

## 2021-12-13 VITALS
SYSTOLIC BLOOD PRESSURE: 167 MMHG | BODY MASS INDEX: 22.99 KG/M2 | RESPIRATION RATE: 16 BRPM | DIASTOLIC BLOOD PRESSURE: 52 MMHG | HEART RATE: 61 BPM | WEIGHT: 110 LBS

## 2021-12-13 DIAGNOSIS — I82.412 DEEP VENOUS THROMBOSIS OF LEFT PROFUNDA FEMORIS VEIN: Primary | ICD-10-CM

## 2021-12-13 PROCEDURE — 99214 PR OFFICE/OUTPT VISIT, EST, LEVL IV, 30-39 MIN: ICD-10-PCS | Mod: S$GLB,,, | Performed by: INTERNAL MEDICINE

## 2021-12-13 PROCEDURE — 99214 OFFICE O/P EST MOD 30 MIN: CPT | Mod: S$GLB,,, | Performed by: INTERNAL MEDICINE

## 2022-03-08 ENCOUNTER — OFFICE VISIT (OUTPATIENT)
Dept: URGENT CARE | Facility: CLINIC | Age: 87
End: 2022-03-08
Payer: MEDICARE

## 2022-03-08 VITALS
RESPIRATION RATE: 20 BRPM | BODY MASS INDEX: 23.09 KG/M2 | HEIGHT: 58 IN | TEMPERATURE: 96 F | SYSTOLIC BLOOD PRESSURE: 165 MMHG | DIASTOLIC BLOOD PRESSURE: 68 MMHG | HEART RATE: 64 BPM | WEIGHT: 110 LBS | OXYGEN SATURATION: 99 %

## 2022-03-08 DIAGNOSIS — S81.812A SKIN TEAR OF LEFT LOWER LEG WITHOUT COMPLICATION, INITIAL ENCOUNTER: Primary | ICD-10-CM

## 2022-03-08 PROCEDURE — 99214 PR OFFICE/OUTPT VISIT, EST, LEVL IV, 30-39 MIN: ICD-10-PCS | Mod: 25,GW,S$GLB, | Performed by: NURSE PRACTITIONER

## 2022-03-08 PROCEDURE — 99214 OFFICE O/P EST MOD 30 MIN: CPT | Mod: 25,GW,S$GLB, | Performed by: NURSE PRACTITIONER

## 2022-03-08 PROCEDURE — 12002 RPR S/N/AX/GEN/TRNK2.6-7.5CM: CPT | Mod: GW,S$GLB,, | Performed by: NURSE PRACTITIONER

## 2022-03-08 PROCEDURE — 12002 LACERATION REPAIR: ICD-10-PCS | Mod: GW,S$GLB,, | Performed by: NURSE PRACTITIONER

## 2022-03-08 RX ORDER — MUPIROCIN 20 MG/G
OINTMENT TOPICAL 2 TIMES DAILY
Qty: 22 G | Refills: 0 | Status: SHIPPED | OUTPATIENT
Start: 2022-03-08 | End: 2022-03-15

## 2022-03-08 NOTE — PROGRESS NOTES
"Subjective:       Patient ID: Mai Suarez is a 89 y.o. female.    Vitals:  height is 4' 10" (1.473 m) and weight is 49.9 kg (110 lb). Her oral temperature is 96.4 °F (35.8 °C). Her blood pressure is 165/68 (abnormal) and her pulse is 64. Her respiration is 20 and oxygen saturation is 99%.     Chief Complaint: Fall    Pt states "she hit her left leg on her wheelchair and has a gash, which is deep."  The wheelchair scraped her leg through her pants.  The pain can not were not disrupted in any way so at no time did discuss oncoming contact with the wheelchair.  Resulting in  A V shaped skin tear which was bleeding a lot at 1st as patient is on Eliquis.  Patient is DNR status and hospice care.  Daughter here with her and states that they decline any antibiotic or tetanus booster.  Desire minimal intervention just to close tear to minimize further bleeding.  Home healthcare nurses are available to the patient every other day and will be checking on wound.  Patient is allergic to shellfish.  Patient and the daughter deny any other injury    Fall  The accident occurred 1 to 3 hours ago. The volume of blood lost was moderate. Point of impact: left leg.       Skin: Positive for avulsion.        Skin tear left lower leg       Objective:      Physical Exam   Constitutional: She is oriented to person, place, and time. She appears well-developed.  Non-toxic appearance. She does not appear ill. No distress.   HENT:   Head: Normocephalic and atraumatic.   Mouth/Throat: Oropharynx is clear and moist.   Eyes: EOM are normal.   Cardiovascular: Normal heart sounds.   Pulmonary/Chest: Effort normal and breath sounds normal.   Abdominal: Normal appearance.   Musculoskeletal:         General: Tenderness and signs of injury present.   Neurological: She is alert and oriented to person, place, and time.   Skin: Skin is warm, dry and not diaphoretic. lesion         Comments: V shaped skin tear approximately 3 cm in length to the lateral " aspect of left lower leg.  Bleeding resolved at this time.   Psychiatric: Her behavior is normal. Mood normal.   Nursing note and vitals reviewed.        Assessment:       No diagnosis found.      Plan:         There are no diagnoses linked to this encounter.

## 2022-03-08 NOTE — PROCEDURES
Laceration Repair    Date/Time: 3/8/2022 1:10 PM  Performed by: Alize Ramos NP  Authorized by: Alize Ramos NP   Consent Done: Not Needed  Body area: lower extremity  Laceration length: 3 cm  Tendon involvement: none  Nerve involvement: none  Vascular damage: no  Anesthesia method: None.    Patient sedated: no  Preparation: Patient was prepped and draped in the usual sterile fashion.  Irrigation solution: saline  Irrigation method: syringe  Amount of cleaning: standard  Debridement: none  Degree of undermining: none  Wound skin closure material used: Steri-Strips.  Approximation: loose  Dressing: non-stick sterile dressing and antibiotic ointment  Comments: Skin flap approximated as well as possible as per daughter's request of minimal intervention requested.  they do not desire any infiltration for pain control.  Gentle manipulation of edges pull together as close as possible knee utilizing Steri-Strips

## 2022-03-11 ENCOUNTER — LAB VISIT (OUTPATIENT)
Dept: LAB | Facility: HOSPITAL | Age: 87
End: 2022-03-11
Attending: INTERNAL MEDICINE
Payer: MEDICARE

## 2022-03-11 DIAGNOSIS — I25.10 CORONARY ATHEROSCLEROSIS OF NATIVE CORONARY ARTERY: Primary | ICD-10-CM

## 2022-03-11 DIAGNOSIS — I50.42 CHRONIC COMBINED SYSTOLIC AND DIASTOLIC HEART FAILURE: ICD-10-CM

## 2022-03-11 PROCEDURE — U0003 INFECTIOUS AGENT DETECTION BY NUCLEIC ACID (DNA OR RNA); SEVERE ACUTE RESPIRATORY SYNDROME CORONAVIRUS 2 (SARS-COV-2) (CORONAVIRUS DISEASE [COVID-19]), AMPLIFIED PROBE TECHNIQUE, MAKING USE OF HIGH THROUGHPUT TECHNOLOGIES AS DESCRIBED BY CMS-2020-01-R: HCPCS | Performed by: INTERNAL MEDICINE

## 2022-03-11 PROCEDURE — U0005 INFEC AGEN DETEC AMPLI PROBE: HCPCS | Performed by: INTERNAL MEDICINE

## 2022-03-12 LAB
SARS-COV-2 RNA RESP QL NAA+PROBE: NOT DETECTED
SARS-COV-2- CYCLE NUMBER: NORMAL

## 2022-03-28 ENCOUNTER — PATIENT MESSAGE (OUTPATIENT)
Dept: HEMATOLOGY/ONCOLOGY | Facility: CLINIC | Age: 87
End: 2022-03-28